# Patient Record
Sex: MALE | Race: BLACK OR AFRICAN AMERICAN | NOT HISPANIC OR LATINO
[De-identification: names, ages, dates, MRNs, and addresses within clinical notes are randomized per-mention and may not be internally consistent; named-entity substitution may affect disease eponyms.]

---

## 2019-12-20 ENCOUNTER — APPOINTMENT (OUTPATIENT)
Dept: FAMILY MEDICINE | Facility: CLINIC | Age: 29
End: 2019-12-20
Payer: OTHER GOVERNMENT

## 2019-12-20 VITALS
SYSTOLIC BLOOD PRESSURE: 133 MMHG | OXYGEN SATURATION: 98 % | DIASTOLIC BLOOD PRESSURE: 95 MMHG | TEMPERATURE: 98 F | WEIGHT: 205 LBS | BODY MASS INDEX: 26.31 KG/M2 | HEART RATE: 98 BPM | HEIGHT: 74 IN

## 2019-12-20 DIAGNOSIS — Z78.9 OTHER SPECIFIED HEALTH STATUS: ICD-10-CM

## 2019-12-20 DIAGNOSIS — Z80.7 FAMILY HISTORY OF OTHER MALIGNANT NEOPLASMS OF LYMPHOID, HEMATOPOIETIC AND RELATED TISSUES: ICD-10-CM

## 2019-12-20 DIAGNOSIS — Z00.00 ENCOUNTER FOR GENERAL ADULT MEDICAL EXAMINATION W/OUT ABNORMAL FINDINGS: ICD-10-CM

## 2019-12-20 PROCEDURE — G0444 DEPRESSION SCREEN ANNUAL: CPT | Mod: 59

## 2019-12-20 PROCEDURE — 99203 OFFICE O/P NEW LOW 30 MIN: CPT | Mod: 25

## 2019-12-20 PROCEDURE — 99385 PREV VISIT NEW AGE 18-39: CPT | Mod: 25

## 2019-12-20 PROCEDURE — 36415 COLL VENOUS BLD VENIPUNCTURE: CPT

## 2019-12-20 PROCEDURE — G0442 ANNUAL ALCOHOL SCREEN 15 MIN: CPT | Mod: 59

## 2019-12-20 NOTE — REVIEW OF SYSTEMS
[Anxiety] : anxiety [Negative] : Heme/Lymph [Suicidal] : not suicidal [Depression] : no depression [Insomnia] : no insomnia

## 2019-12-20 NOTE — PHYSICAL EXAM
[No Acute Distress] : no acute distress [Well Nourished] : well nourished [Well Developed] : well developed [Well-Appearing] : well-appearing [Normal Sclera/Conjunctiva] : normal sclera/conjunctiva [PERRL] : pupils equal round and reactive to light [EOMI] : extraocular movements intact [Normal Outer Ear/Nose] : the outer ears and nose were normal in appearance [Normal Oropharynx] : the oropharynx was normal [No JVD] : no jugular venous distention [Supple] : supple [No Lymphadenopathy] : no lymphadenopathy [Thyroid Normal, No Nodules] : the thyroid was normal and there were no nodules present [No Accessory Muscle Use] : no accessory muscle use [No Respiratory Distress] : no respiratory distress  [Clear to Auscultation] : lungs were clear to auscultation bilaterally [Normal Rate] : normal rate  [Regular Rhythm] : with a regular rhythm [Normal S1, S2] : normal S1 and S2 [No Carotid Bruits] : no carotid bruits [No Murmur] : no murmur heard [No Abdominal Bruit] : a ~M bruit was not heard ~T in the abdomen [No Varicosities] : no varicosities [Pedal Pulses Present] : the pedal pulses are present [No Edema] : there was no peripheral edema [No Palpable Aorta] : no palpable aorta [No Extremity Clubbing/Cyanosis] : no extremity clubbing/cyanosis [Soft] : abdomen soft [Non-distended] : non-distended [Non Tender] : non-tender [Normal Bowel Sounds] : normal bowel sounds [No HSM] : no HSM [No Masses] : no abdominal mass palpated [Normal Posterior Cervical Nodes] : no posterior cervical lymphadenopathy [Normal Anterior Cervical Nodes] : no anterior cervical lymphadenopathy [No Spinal Tenderness] : no spinal tenderness [No CVA Tenderness] : no CVA  tenderness [Grossly Normal Strength/Tone] : grossly normal strength/tone [No Joint Swelling] : no joint swelling [No Rash] : no rash [Coordination Grossly Intact] : coordination grossly intact [Normal Gait] : normal gait [Deep Tendon Reflexes (DTR)] : deep tendon reflexes were 2+ and symmetric [No Focal Deficits] : no focal deficits [Normal Affect] : the affect was normal [Normal Insight/Judgement] : insight and judgment were intact

## 2019-12-20 NOTE — HISTORY OF PRESENT ILLNESS
[de-identified] : 28 yo m presents to establish care.\par Hx of anxiety causing trouble sleeping as of the past year. Father recent diagnosis of cancer x 1 year. Interested in seeking therapy as well.\par Denies fevers, chills, cp, sob.  [FreeTextEntry1] : establish care

## 2019-12-20 NOTE — HEALTH RISK ASSESSMENT
[Good] : ~his/her~ current health as good [Fair] :  ~his/her~ mood as fair [1 or 2 (0 pts)] : 1 or 2 (0 points) [Yes] : Yes [2 - 3 times a week (3 pts)] : 2 - 3  times a week (3 points) [Never (0 pts)] : Never (0 points) [No] : In the past 12 months have you used drugs other than those required for medical reasons? No [No falls in past year] : Patient reported no falls in the past year [0] : 2) Feeling down, depressed, or hopeless: Not at all (0) [HIV test declined] : HIV test declined [Hepatitis C test declined] : Hepatitis C test declined [None] : None [With Significant Other] : lives with significant other [Employed] : employed [Sexually Active] : sexually active [] :  [Feels Safe at Home] : Feels safe at home [Fully functional (bathing, dressing, toileting, transferring, walking, feeding)] : Fully functional (bathing, dressing, toileting, transferring, walking, feeding) [Fully functional (using the telephone, shopping, preparing meals, housekeeping, doing laundry, using] : Fully functional and needs no help or supervision to perform IADLs (using the telephone, shopping, preparing meals, housekeeping, doing laundry, using transportation, managing medications and managing finances) [] : No [Audit-CScore] : 3 [de-identified] : 3x/ week, cardio and weights  [[id number]] : 0 [de-identified] : varied/ balanced  [Change in mental status noted] : No change in mental status noted [High Risk Behavior] : no high risk behavior [Language] : denies difficulty with language [Reports changes in hearing] : Reports no changes in hearing [FreeTextEntry2] : [employer] [Reports changes in vision] : Reports no changes in vision

## 2019-12-23 DIAGNOSIS — R79.89 OTHER SPECIFIED ABNORMAL FINDINGS OF BLOOD CHEMISTRY: ICD-10-CM

## 2019-12-23 LAB
25(OH)D3 SERPL-MCNC: 11.6 NG/ML
ALBUMIN SERPL ELPH-MCNC: 5.1 G/DL
ALP BLD-CCNC: 61 U/L
ALT SERPL-CCNC: 72 U/L
ANION GAP SERPL CALC-SCNC: 14 MMOL/L
AST SERPL-CCNC: 35 U/L
BASOPHILS # BLD AUTO: 0.03 K/UL
BASOPHILS NFR BLD AUTO: 0.7 %
BILIRUB SERPL-MCNC: 0.5 MG/DL
BUN SERPL-MCNC: 6 MG/DL
CALCIUM SERPL-MCNC: 9.7 MG/DL
CHLORIDE SERPL-SCNC: 104 MMOL/L
CHOLEST SERPL-MCNC: 247 MG/DL
CHOLEST/HDLC SERPL: 3 RATIO
CO2 SERPL-SCNC: 24 MMOL/L
CREAT SERPL-MCNC: 0.92 MG/DL
EOSINOPHIL # BLD AUTO: 0.05 K/UL
EOSINOPHIL NFR BLD AUTO: 1.2 %
ESTIMATED AVERAGE GLUCOSE: 111 MG/DL
GLUCOSE SERPL-MCNC: 96 MG/DL
HBA1C MFR BLD HPLC: 5.5 %
HCT VFR BLD CALC: 45.9 %
HDLC SERPL-MCNC: 83 MG/DL
HGB BLD-MCNC: 14.7 G/DL
IMM GRANULOCYTES NFR BLD AUTO: 0.2 %
LDLC SERPL CALC-MCNC: 146 MG/DL
LYMPHOCYTES # BLD AUTO: 1.71 K/UL
LYMPHOCYTES NFR BLD AUTO: 42.4 %
MAN DIFF?: NORMAL
MCHC RBC-ENTMCNC: 30.1 PG
MCHC RBC-ENTMCNC: 32 GM/DL
MCV RBC AUTO: 94.1 FL
MONOCYTES # BLD AUTO: 0.46 K/UL
MONOCYTES NFR BLD AUTO: 11.4 %
NEUTROPHILS # BLD AUTO: 1.77 K/UL
NEUTROPHILS NFR BLD AUTO: 44.1 %
PLATELET # BLD AUTO: 208 K/UL
POTASSIUM SERPL-SCNC: 4.4 MMOL/L
PROT SERPL-MCNC: 7.7 G/DL
RBC # BLD: 4.88 M/UL
RBC # FLD: 14.3 %
SODIUM SERPL-SCNC: 142 MMOL/L
TRIGL SERPL-MCNC: 88 MG/DL
TSH SERPL-ACNC: 1.23 UIU/ML
WBC # FLD AUTO: 4.03 K/UL

## 2020-01-08 ENCOUNTER — APPOINTMENT (OUTPATIENT)
Dept: FAMILY MEDICINE | Facility: CLINIC | Age: 30
End: 2020-01-08
Payer: OTHER GOVERNMENT

## 2020-01-08 VITALS
SYSTOLIC BLOOD PRESSURE: 147 MMHG | WEIGHT: 205 LBS | BODY MASS INDEX: 26.31 KG/M2 | DIASTOLIC BLOOD PRESSURE: 91 MMHG | HEIGHT: 74 IN | OXYGEN SATURATION: 100 % | HEART RATE: 94 BPM | TEMPERATURE: 98.9 F

## 2020-01-08 DIAGNOSIS — F41.9 ANXIETY DISORDER, UNSPECIFIED: ICD-10-CM

## 2020-01-08 DIAGNOSIS — R03.0 ELEVATED BLOOD-PRESSURE READING, W/OUT DIAGNOSIS OF HYPERTENSION: ICD-10-CM

## 2020-01-08 PROCEDURE — 99213 OFFICE O/P EST LOW 20 MIN: CPT | Mod: 25

## 2020-01-08 NOTE — HISTORY OF PRESENT ILLNESS
[FreeTextEntry1] : anxiety med fu\par bp med fu  [de-identified] : 28 yo m presents to discuss anxiety meds. \par Has been taking 10 mg. No side effects, feels well on it. \par Mentions he feels better on it. \par BP elevated, just found out he is having baby. \par Denies fevers, chills, cp, sob.

## 2020-08-14 VITALS
TEMPERATURE: 98 F | OXYGEN SATURATION: 98 % | HEART RATE: 152 BPM | RESPIRATION RATE: 16 BRPM | SYSTOLIC BLOOD PRESSURE: 118 MMHG | DIASTOLIC BLOOD PRESSURE: 85 MMHG

## 2020-08-14 LAB
ALBUMIN SERPL ELPH-MCNC: 3.4 G/DL — SIGNIFICANT CHANGE UP (ref 3.3–5)
ALBUMIN SERPL ELPH-MCNC: 4.4 G/DL — SIGNIFICANT CHANGE UP (ref 3.3–5)
ALP SERPL-CCNC: 61 U/L — SIGNIFICANT CHANGE UP (ref 40–120)
ALP SERPL-CCNC: SIGNIFICANT CHANGE UP (ref 40–120)
ALT FLD-CCNC: 51 U/L — HIGH (ref 10–45)
ALT FLD-CCNC: SIGNIFICANT CHANGE UP (ref 10–45)
ANION GAP SERPL CALC-SCNC: 26 MMOL/L — HIGH (ref 5–17)
ANION GAP SERPL CALC-SCNC: 30 MMOL/L — HIGH (ref 5–17)
ANION GAP SERPL CALC-SCNC: 35 MMOL/L — HIGH (ref 5–17)
ANISOCYTOSIS BLD QL: SLIGHT — SIGNIFICANT CHANGE UP
APPEARANCE UR: CLEAR — SIGNIFICANT CHANGE UP
APTT BLD: 33.9 SEC — SIGNIFICANT CHANGE UP (ref 27.5–35.5)
AST SERPL-CCNC: 76 U/L — HIGH (ref 10–40)
AST SERPL-CCNC: SIGNIFICANT CHANGE UP (ref 10–40)
BASE EXCESS BLDA CALC-SCNC: -10.2 MMOL/L — LOW (ref -2–3)
BASE EXCESS BLDV CALC-SCNC: -9.6 MMOL/L — SIGNIFICANT CHANGE UP
BASOPHILS # BLD AUTO: 0 K/UL — SIGNIFICANT CHANGE UP (ref 0–0.2)
BASOPHILS NFR BLD AUTO: 0 % — SIGNIFICANT CHANGE UP (ref 0–2)
BILIRUB SERPL-MCNC: 0.9 MG/DL — SIGNIFICANT CHANGE UP (ref 0.2–1.2)
BILIRUB SERPL-MCNC: 1.1 MG/DL — SIGNIFICANT CHANGE UP (ref 0.2–1.2)
BILIRUB UR-MCNC: ABNORMAL
BLD GP AB SCN SERPL QL: NEGATIVE — SIGNIFICANT CHANGE UP
BUN SERPL-MCNC: 10 MG/DL — SIGNIFICANT CHANGE UP (ref 7–23)
BUN SERPL-MCNC: 9 MG/DL — SIGNIFICANT CHANGE UP (ref 7–23)
BUN SERPL-MCNC: 9 MG/DL — SIGNIFICANT CHANGE UP (ref 7–23)
CA-I SERPL-SCNC: 0.96 MMOL/L — LOW (ref 1.12–1.3)
CALCIUM SERPL-MCNC: 10.2 MG/DL — SIGNIFICANT CHANGE UP (ref 8.4–10.5)
CALCIUM SERPL-MCNC: 7.6 MG/DL — LOW (ref 8.4–10.5)
CALCIUM SERPL-MCNC: 8.5 MG/DL — SIGNIFICANT CHANGE UP (ref 8.4–10.5)
CHLORIDE SERPL-SCNC: 83 MMOL/L — LOW (ref 96–108)
CHLORIDE SERPL-SCNC: 93 MMOL/L — LOW (ref 96–108)
CHLORIDE SERPL-SCNC: 97 MMOL/L — SIGNIFICANT CHANGE UP (ref 96–108)
CO2 SERPL-SCNC: 12 MMOL/L — LOW (ref 22–31)
CO2 SERPL-SCNC: 15 MMOL/L — LOW (ref 22–31)
CO2 SERPL-SCNC: 15 MMOL/L — LOW (ref 22–31)
COLOR SPEC: YELLOW — SIGNIFICANT CHANGE UP
CREAT SERPL-MCNC: 1.26 MG/DL — SIGNIFICANT CHANGE UP (ref 0.5–1.3)
CREAT SERPL-MCNC: 1.3 MG/DL — SIGNIFICANT CHANGE UP (ref 0.5–1.3)
CREAT SERPL-MCNC: 1.49 MG/DL — HIGH (ref 0.5–1.3)
DACRYOCYTES BLD QL SMEAR: SLIGHT — SIGNIFICANT CHANGE UP
DIFF PNL FLD: ABNORMAL
EOSINOPHIL # BLD AUTO: 0 K/UL — SIGNIFICANT CHANGE UP (ref 0–0.5)
EOSINOPHIL NFR BLD AUTO: 0 % — SIGNIFICANT CHANGE UP (ref 0–6)
GAS PNL BLDV: 136 MMOL/L — LOW (ref 138–146)
GAS PNL BLDV: SIGNIFICANT CHANGE UP
GIANT PLATELETS BLD QL SMEAR: PRESENT — SIGNIFICANT CHANGE UP
GLUCOSE SERPL-MCNC: 207 MG/DL — HIGH (ref 70–99)
GLUCOSE SERPL-MCNC: 248 MG/DL — HIGH (ref 70–99)
GLUCOSE SERPL-MCNC: 304 MG/DL — HIGH (ref 70–99)
GLUCOSE UR QL: 100
HCO3 BLDA-SCNC: 12 MMOL/L — LOW (ref 21–28)
HCO3 BLDV-SCNC: 16 MMOL/L — LOW (ref 20–27)
HCT VFR BLD CALC: 40 % — SIGNIFICANT CHANGE UP (ref 39–50)
HCT VFR BLD CALC: 52.9 % — HIGH (ref 39–50)
HGB BLD-MCNC: 13.8 G/DL — SIGNIFICANT CHANGE UP (ref 13–17)
HGB BLD-MCNC: 18 G/DL — HIGH (ref 13–17)
INR BLD: 1.45 — HIGH (ref 0.88–1.16)
KETONES UR-MCNC: 15 MG/DL
LACTATE SERPL-SCNC: 11 MMOL/L — CRITICAL HIGH (ref 0.5–2)
LACTATE SERPL-SCNC: 13.6 MMOL/L — CRITICAL HIGH (ref 0.5–2)
LACTATE SERPL-SCNC: 16 MMOL/L — CRITICAL HIGH (ref 0.5–2)
LEUKOCYTE ESTERASE UR-ACNC: NEGATIVE — SIGNIFICANT CHANGE UP
LIDOCAIN IGE QN: 649 U/L — HIGH (ref 7–60)
LYMPHOCYTES # BLD AUTO: 0.18 K/UL — LOW (ref 1–3.3)
LYMPHOCYTES # BLD AUTO: 0.9 % — LOW (ref 13–44)
MACROCYTES BLD QL: SIGNIFICANT CHANGE UP
MANUAL SMEAR VERIFICATION: SIGNIFICANT CHANGE UP
MCHC RBC-ENTMCNC: 31.3 PG — SIGNIFICANT CHANGE UP (ref 27–34)
MCHC RBC-ENTMCNC: 31.4 PG — SIGNIFICANT CHANGE UP (ref 27–34)
MCHC RBC-ENTMCNC: 34 GM/DL — SIGNIFICANT CHANGE UP (ref 32–36)
MCHC RBC-ENTMCNC: 34.5 GM/DL — SIGNIFICANT CHANGE UP (ref 32–36)
MCV RBC AUTO: 91.1 FL — SIGNIFICANT CHANGE UP (ref 80–100)
MCV RBC AUTO: 91.8 FL — SIGNIFICANT CHANGE UP (ref 80–100)
MONOCYTES # BLD AUTO: 0.52 K/UL — SIGNIFICANT CHANGE UP (ref 0–0.9)
MONOCYTES NFR BLD AUTO: 2.6 % — SIGNIFICANT CHANGE UP (ref 2–14)
NEUTROPHILS # BLD AUTO: 19.16 K/UL — HIGH (ref 1.8–7.4)
NEUTROPHILS NFR BLD AUTO: 95.6 % — HIGH (ref 43–77)
NEUTS BAND # BLD: 0.9 % — SIGNIFICANT CHANGE UP (ref 0–8)
NITRITE UR-MCNC: NEGATIVE — SIGNIFICANT CHANGE UP
PCO2 BLDA: 21 MMHG — LOW (ref 35–48)
PCO2 BLDV: 32 MMHG — LOW (ref 41–51)
PH BLDA: 7.39 — SIGNIFICANT CHANGE UP (ref 7.35–7.45)
PH BLDV: 7.3 — LOW (ref 7.32–7.43)
PH UR: 6 — SIGNIFICANT CHANGE UP (ref 5–8)
PLAT MORPH BLD: NORMAL — SIGNIFICANT CHANGE UP
PLATELET # BLD AUTO: 192 K/UL — SIGNIFICANT CHANGE UP (ref 150–400)
PLATELET # BLD AUTO: 287 K/UL — SIGNIFICANT CHANGE UP (ref 150–400)
PO2 BLDA: 210 MMHG — HIGH (ref 83–108)
PO2 BLDV: 47 MMHG — SIGNIFICANT CHANGE UP
POLYCHROMASIA BLD QL SMEAR: SLIGHT — SIGNIFICANT CHANGE UP
POTASSIUM BLDV-SCNC: 3.9 MMOL/L — SIGNIFICANT CHANGE UP (ref 3.5–4.9)
POTASSIUM SERPL-MCNC: 3.6 MMOL/L — SIGNIFICANT CHANGE UP (ref 3.5–5.3)
POTASSIUM SERPL-MCNC: 3.9 MMOL/L — SIGNIFICANT CHANGE UP (ref 3.5–5.3)
POTASSIUM SERPL-MCNC: SIGNIFICANT CHANGE UP (ref 3.5–5.3)
POTASSIUM SERPL-SCNC: 3.6 MMOL/L — SIGNIFICANT CHANGE UP (ref 3.5–5.3)
POTASSIUM SERPL-SCNC: 3.9 MMOL/L — SIGNIFICANT CHANGE UP (ref 3.5–5.3)
POTASSIUM SERPL-SCNC: SIGNIFICANT CHANGE UP (ref 3.5–5.3)
PROT SERPL-MCNC: 6.1 G/DL — SIGNIFICANT CHANGE UP (ref 6–8.3)
PROT SERPL-MCNC: 8.2 G/DL — SIGNIFICANT CHANGE UP (ref 6–8.3)
PROT UR-MCNC: 100 MG/DL
PROTHROM AB SERPL-ACNC: 17.1 SEC — HIGH (ref 10.6–13.6)
RBC # BLD: 4.39 M/UL — SIGNIFICANT CHANGE UP (ref 4.2–5.8)
RBC # BLD: 5.76 M/UL — SIGNIFICANT CHANGE UP (ref 4.2–5.8)
RBC # FLD: 14.4 % — SIGNIFICANT CHANGE UP (ref 10.3–14.5)
RBC # FLD: 14.7 % — HIGH (ref 10.3–14.5)
RBC BLD AUTO: ABNORMAL
RH IG SCN BLD-IMP: POSITIVE — SIGNIFICANT CHANGE UP
RH IG SCN BLD-IMP: POSITIVE — SIGNIFICANT CHANGE UP
SAO2 % BLDA: 100 % — SIGNIFICANT CHANGE UP (ref 95–100)
SAO2 % BLDV: 73 % — SIGNIFICANT CHANGE UP
SARS-COV-2 RNA SPEC QL NAA+PROBE: SIGNIFICANT CHANGE UP
SMUDGE CELLS # BLD: PRESENT — SIGNIFICANT CHANGE UP
SODIUM SERPL-SCNC: 133 MMOL/L — LOW (ref 135–145)
SODIUM SERPL-SCNC: 135 MMOL/L — SIGNIFICANT CHANGE UP (ref 135–145)
SODIUM SERPL-SCNC: 138 MMOL/L — SIGNIFICANT CHANGE UP (ref 135–145)
SP GR SPEC: >=1.03 — SIGNIFICANT CHANGE UP (ref 1–1.03)
SPHEROCYTES BLD QL SMEAR: SIGNIFICANT CHANGE UP
UROBILINOGEN FLD QL: 0.2 E.U./DL — SIGNIFICANT CHANGE UP
WBC # BLD: 14.67 K/UL — HIGH (ref 3.8–10.5)
WBC # BLD: 19.86 K/UL — HIGH (ref 3.8–10.5)
WBC # FLD AUTO: 14.67 K/UL — HIGH (ref 3.8–10.5)
WBC # FLD AUTO: 19.86 K/UL — HIGH (ref 3.8–10.5)

## 2020-08-14 PROCEDURE — 74019 RADEX ABDOMEN 2 VIEWS: CPT | Mod: 26

## 2020-08-14 PROCEDURE — 74176 CT ABD & PELVIS W/O CONTRAST: CPT | Mod: 26

## 2020-08-14 PROCEDURE — 71046 X-RAY EXAM CHEST 2 VIEWS: CPT | Mod: 26

## 2020-08-14 PROCEDURE — 71045 X-RAY EXAM CHEST 1 VIEW: CPT | Mod: 26,59

## 2020-08-14 RX ORDER — MORPHINE SULFATE 50 MG/1
4 CAPSULE, EXTENDED RELEASE ORAL ONCE
Refills: 0 | Status: DISCONTINUED | OUTPATIENT
Start: 2020-08-14 | End: 2020-08-14

## 2020-08-14 RX ORDER — SODIUM CHLORIDE 9 MG/ML
1000 INJECTION INTRAMUSCULAR; INTRAVENOUS; SUBCUTANEOUS
Refills: 0 | Status: DISCONTINUED | OUTPATIENT
Start: 2020-08-14 | End: 2020-08-15

## 2020-08-14 RX ORDER — SODIUM CHLORIDE 9 MG/ML
1000 INJECTION INTRAMUSCULAR; INTRAVENOUS; SUBCUTANEOUS ONCE
Refills: 0 | Status: COMPLETED | OUTPATIENT
Start: 2020-08-14 | End: 2020-08-14

## 2020-08-14 RX ORDER — PANTOPRAZOLE SODIUM 20 MG/1
40 TABLET, DELAYED RELEASE ORAL ONCE
Refills: 0 | Status: COMPLETED | OUTPATIENT
Start: 2020-08-14 | End: 2020-08-14

## 2020-08-14 RX ORDER — PIPERACILLIN AND TAZOBACTAM 4; .5 G/20ML; G/20ML
3.38 INJECTION, POWDER, LYOPHILIZED, FOR SOLUTION INTRAVENOUS ONCE
Refills: 0 | Status: COMPLETED | OUTPATIENT
Start: 2020-08-14 | End: 2020-08-14

## 2020-08-14 RX ORDER — FAMOTIDINE 10 MG/ML
20 INJECTION INTRAVENOUS ONCE
Refills: 0 | Status: DISCONTINUED | OUTPATIENT
Start: 2020-08-14 | End: 2020-08-14

## 2020-08-14 RX ORDER — CHLORPROMAZINE HCL 10 MG
25 TABLET ORAL ONCE
Refills: 0 | Status: COMPLETED | OUTPATIENT
Start: 2020-08-14 | End: 2020-08-14

## 2020-08-14 RX ORDER — ONDANSETRON 8 MG/1
4 TABLET, FILM COATED ORAL ONCE
Refills: 0 | Status: COMPLETED | OUTPATIENT
Start: 2020-08-14 | End: 2020-08-14

## 2020-08-14 RX ADMIN — SODIUM CHLORIDE 1000 MILLILITER(S): 9 INJECTION INTRAMUSCULAR; INTRAVENOUS; SUBCUTANEOUS at 20:44

## 2020-08-14 RX ADMIN — PANTOPRAZOLE SODIUM 40 MILLIGRAM(S): 20 TABLET, DELAYED RELEASE ORAL at 20:34

## 2020-08-14 RX ADMIN — MORPHINE SULFATE 4 MILLIGRAM(S): 50 CAPSULE, EXTENDED RELEASE ORAL at 21:48

## 2020-08-14 RX ADMIN — SODIUM CHLORIDE 1000 MILLILITER(S): 9 INJECTION INTRAMUSCULAR; INTRAVENOUS; SUBCUTANEOUS at 20:18

## 2020-08-14 RX ADMIN — Medication 1 MILLIGRAM(S): at 21:24

## 2020-08-14 RX ADMIN — PIPERACILLIN AND TAZOBACTAM 200 GRAM(S): 4; .5 INJECTION, POWDER, LYOPHILIZED, FOR SOLUTION INTRAVENOUS at 21:24

## 2020-08-14 RX ADMIN — ONDANSETRON 4 MILLIGRAM(S): 8 TABLET, FILM COATED ORAL at 20:34

## 2020-08-14 RX ADMIN — SODIUM CHLORIDE 1000 MILLILITER(S): 9 INJECTION INTRAMUSCULAR; INTRAVENOUS; SUBCUTANEOUS at 21:53

## 2020-08-14 RX ADMIN — Medication 102 MILLIGRAM(S): at 20:35

## 2020-08-14 RX ADMIN — SODIUM CHLORIDE 1000 MILLILITER(S): 9 INJECTION INTRAMUSCULAR; INTRAVENOUS; SUBCUTANEOUS at 21:30

## 2020-08-14 RX ADMIN — MORPHINE SULFATE 4 MILLIGRAM(S): 50 CAPSULE, EXTENDED RELEASE ORAL at 21:24

## 2020-08-14 NOTE — ED ADULT TRIAGE NOTE - OTHER COMPLAINTS
pt c.o abd pain with n/v since yesterday. denies diarrhea. pt presents to ed with elevated HR, ekg in progress.

## 2020-08-14 NOTE — ED PROVIDER NOTE - DIAGNOSTIC INTERPRETATION
ER Physician:  Annabelle Director  CHEST XRAY INTERPRETATION: lungs clear, heart shadow normal, bony structures intact   ER Physician: Annabelle Naqvi  ABDOMINAL XRAY INTERPRETATION:  sbo, ? volvulus, no free air noted, no apparent hepatomegaly

## 2020-08-14 NOTE — ED ADULT NURSE REASSESSMENT NOTE - NS ED NURSE REASSESS COMMENT FT1
Clear yellow urine sample collected, sent to lab, waiting results. Continuous monitoring of pt maintained, pt waiting admission bed assignment

## 2020-08-14 NOTE — ED PROVIDER NOTE - OBJECTIVE STATEMENT
31 yo male h/o anxiety (noncompliant w lexapro) c/o 3 abd bloating/pain, distension, obstipation w/o bm x 4 d, no flatus, intractable hiccups and n/v with any po intake attempts.  No prior abd surgery or sx.  No sick contacts, travel.  No fever.  No cp, sob.  Pt states he drinks 2-3 alchoholic tea drinks a day but denies h/o w/d sx if he doesn't drink.  Pt states he tried drinking shots today to stop the hiccuping as this has helped in the past but did not help this time.  Pt denies h/o etoh abuse.  Pt reports no po's x 3 d 2/2 sx.  No uri sx, cough, dysuria.  No drugs.

## 2020-08-14 NOTE — CONSULT NOTE ADULT - ASSESSMENT
This is a 31 y/o M, who presented with severe abdominal pain and vomiting, ED consulted us because of suspicion of sigmoid volvulus.    The patient was tachycardic on presentation, resuscitated with IV fluids NGT was inserted    Lactate was 16 ---->13--->11, WBC 19.8 1 dose of Zosyn was given by ED  Lipase 645    The patient was found to have severe pancreatitis, no evidence of volvulus on the CT scan.      Plan:  - No surgical management is warranted  - Continue resuscitation  - Bowel rest and maintain NGT to low intermittent suction  - Surgery will continue to follow

## 2020-08-14 NOTE — ED ADULT NURSE REASSESSMENT NOTE - NS ED NURSE REASSESS COMMENT FT1
NG tube placed by SUrgery resident, drain dark brown output. Xray to bedside for placement confirmation.

## 2020-08-14 NOTE — CONSULT NOTE ADULT - SUBJECTIVE AND OBJECTIVE BOX
This is a 31 y/o m who presented to ED with severe abdominal pian and distension that started 3 days ago, and was gradually increasing in intensity and was allover his abdomen associated with vomiting multiple times and obstipation and severe bloating.  The patient said that he had this pain before with bloating but it always resolved on its own within 2 days but this time increased especially after he had 5 shots of alcohol. The patient denies, fever or chills.  The patient admit to daily drinking, amount was non specified.   The ED consulted us after obtaining of XR that was showing suspicion of sigmoid volvulus  On arrival to ED the patient was severely tachycardic 154 Sinus Rhythm BP was WNL, lactate was 16 and WBC 19, lipase 645., Glucose 304  The patient was properly resuscitated with 3 L of NS, NGT was inserted only 150 dark fluid came out      PMH: ADD on Lexapro (non compliant)   PSH: surgery on left knee   FMH; father had Lymphoma  SH: daily etoh, occasional cigar, denies illicit drugs  Allergies: NKDA    Vital Signs Last 24 Hrs  T(C): 36.9 (14 Aug 2020 19:44), Max: 36.9 (14 Aug 2020 19:44)  T(F): 98.4 (14 Aug 2020 19:44), Max: 98.4 (14 Aug 2020 19:44)  HR: 156 (14 Aug 2020 23:00) (144 - 156)  BP: 104/53 (14 Aug 2020 23:00) (104/53 - 134/60)  BP(mean): --  RR: 18 (14 Aug 2020 23:00) (16 - 20)  SpO2: 99% (14 Aug 2020 23:00) (98% - 99%)    Gen: in distress  Pulm: Good inspiratory effort, nonlabored breathing  C/V: tachycardic   Abd: moderately distended diffuse tenderness, nondistended. No rebound, no guarding.   Extrem: WWP, no edema                          13.8   14.67 )-----------( 192      ( 14 Aug 2020 22:49 )             40.0   08-14    138  |  97  |  9   ----------------------------<  207<H>  3.9   |  15<L>  |  1.26    Ca    7.6<L>      14 Aug 2020 23:03    TPro  6.1  /  Alb  3.4  /  TBili  0.9  /  DBili  x   /  AST  76<H>  /  ALT  51<H>  /  AlkPhos  61  08-14    Lipase, Serum: 649 U/L (08.14.20 @ 20:23)    Triglycerides, Serum: 133 mg/dL (08.14.20 @ 21:20)    Lactate, Blood: 11.0: TYPE:(C=Critical, N=Notification, A=Abnormal) C    < from: CT Abdomen and Pelvis No Cont (08.14.20 @ 22:35) >  indings:    Evaluation of the abdominopelvic viscera is limited due to noncontrast technique and streak artifact from overlying arms.    Lower chest: No consolidation or significant pleural effusion.    Liver:  Diffuse hepatic steatosis. Mild hepatomegaly. No definite focal lesion.    Gallbladder: No calcified gallstones. Gallbladder sludge. No biliary dilatation.    Spleen:  Normal.    Pancreas:  Pancreas is diffusely enlarged with significant amount of peripancreatic fat strandingand fluid tracking down both lateral aspects of the anterior pararenal space. No drainable/organized collection on this noncontrast examination. No pancreatic duct dilatation.    Adrenal glands:  Normal.    Kidneys: Normal.    Adenopathy:  No lymphadenopathy in abdomen or pelvis.    Ascites: Mild ascites along the right paracolic gutter and within the rectovesical pouch.    Gastrointestinal tract: Evaluation of the GI tract without the use of oral contrast shows an enteric tube with tip in the gastric antrum. No bowel obstruction. Gas-filled ascending and transverse colon. Wall thickening of the descending colon is likely reactive due to adjacent inflammatory changes from the acute pancreatitis. Possible mild wall thickening of the second andthird duodenal segments could represent reactive duodenitis. Appendix is normal.    Vessels: Normal.    Pelvic organs: Distended bladder. Normal-sized prostate.    Soft tissues: Normal.    Bones: Normal.    Impression:  Interstitial edematous pancreatitis. No drainable/organized collection on this noncontrast examination. Additional findings as above.      < end of copied text >

## 2020-08-14 NOTE — ED ADULT NURSE REASSESSMENT NOTE - NS ED NURSE REASSESS COMMENT FT1
Pt transported to CT and back to ER room 20, on monitor with RN and tech, waiting results. Repeat labs drawn and sent, waiting results.

## 2020-08-14 NOTE — ED ADULT NURSE REASSESSMENT NOTE - NS ED NURSE REASSESS COMMENT FT1
Report received from DUSTY Bender, will assume care of pt at this time. Pt in ER room 20, on cardiac monitor, NIBP and SpO2, NS up infusing to 18g RAC, 16f NGT, left nare, to intermittent, in-wall suction. Assessment as noted, pt waiting transportation to CT

## 2020-08-14 NOTE — ED ADULT NURSE NOTE - NSIMPLEMENTINTERV_GEN_ALL_ED
Implemented All Universal Safety Interventions:  [place] to call system. Call bell, personal items and telephone within reach. Instruct patient to call for assistance. Room bathroom lighting operational. Non-slip footwear when patient is off stretcher. Physically safe environment: no spills, clutter or unnecessary equipment. Stretcher in lowest position, wheels locked, appropriate side rails in place.

## 2020-08-14 NOTE — ED ADULT NURSE NOTE - OBJECTIVE STATEMENT
30y M, A&ox3, presents to ed for abd pain with nausea since yesterday with hiccups. No cp, no sob, no cough, no fevers nor chills. Pt noted tachy in 140s-150s, upgraded to . Director. Noted distended abdomen, with decreased flatulence, last bm on tuesday. No urinary s/s. IV placed, lab drawn. Pt brought to xray.

## 2020-08-14 NOTE — ED ADULT NURSE REASSESSMENT NOTE - NS ED NURSE REASSESS COMMENT FT1
NG tube placement confirmed via xray. some relief in s/s. PT continues to be tachy 140s, on cardiac monitor. PT pending ct, ct made aware. Pt endorsed to DUSTY LEES

## 2020-08-14 NOTE — ED PROVIDER NOTE - CLINICAL SUMMARY MEDICAL DECISION MAKING FREE TEXT BOX
Pt c/o abd pain, distension, hiccuping, obstipation; exam and hpi concerning for sbo, less concern for perf.  Pt reports etoh use but no h/o w/d, ? if w/d contributing to abnl vs or pt just w severe dehydration and pain.  ? pancreatitis, pud.  Plan labs, ivf, pain/n meds, axr, cxr, ct abd (will assess po contrast after axr);reassess. Pt c/o abd pain, distension, hiccuping, obstipation; exam and hpi concerning for sbo, less concern for perf.  Pt w tachycardia, appears dehydrated on exam. Pt reports etoh use but no h/o w/d, ? if etoh depredence and w/d contributing to abnl vs or pt just w severe dehydration and pain.  ? pancreatitis, pud.  Plan labs, ivf, pain/n meds, axr, cxr, ct abd (will assess po contrast after axr), consider benzos, ;reassess.

## 2020-08-14 NOTE — ED PROVIDER NOTE - CONSTITUTIONAL, MLM
normal... ill appearing, awake, alert, oriented to person, place, time/situation, intractable hiccuping

## 2020-08-14 NOTE — ED PROVIDER NOTE - PROGRESS NOTE DETAILS
Surg urgently consulted ~ 9 p 2/2 lactate 16, xray w sbo pattern and ? volvulus.  Pt w elevated wbc, h/h - suspect h/h elevated 2/2 hemoconcentration.  Lipase 649 - ativan added in case etoh w/d affecting pt's condition and vs.  CT abd and repeat labs as well as abg ordered.  Surg at bedside and planning NG, kallie, agree w current mgmt. Glu elevated, + ag but pH normal, glu improving w ivf - unclear if pt w dka vs ag 2/2 lactic acidosis.  ? if pt in new dka 2/2 intra-abd process.  Beta hydroxybutyrate added to labs.  Pt denies h/o dm. Glu elevated, + ag but pH normal, glu improving w ivf - unclear if pt w dka vs ag 2/2 lactic acidosis.  ? if pt in new dka 2/2 intra-abd process. ? pancreatitis 2/2 high tg.   Beta hydroxybutyrate, tg added to labs.  Pt denies h/o dm.  Repeat bmp, vbg, lactate, cbc ordered after 4th liter. Glu elevated, + ag but pH normal, glu improving w ivf - unclear if pt w dka vs ag 2/2 lactic acidosis and aka.  ? if pt in new dka 2/2 intra-abd process. ? pancreatitis 2/2 high tg or etoh abuse.   Beta hydroxybutyrate, tg added to labs.  Pt denies h/o dm.  Repeat bmp, vbg, lactate, cbc ordered after 4th liter. Beta hydroxybutyrate and tg nl.  CT w/ pancreatitis but no sbo, volvulus. Surg signed off.  GI consulted and will eval in am.  ICU consulted and will admit to step down.

## 2020-08-14 NOTE — ED PROVIDER NOTE - ENMT, MLM
Airway patent, Nasal mucosa clear. Mouth with v dry mucosa. Throat has no vesicles, no oropharyngeal exudates and uvula is midline.

## 2020-08-15 ENCOUNTER — INPATIENT (INPATIENT)
Facility: HOSPITAL | Age: 30
LOS: 2 days | Discharge: HOME | DRG: 439 | End: 2020-08-18
Attending: HOSPITALIST | Admitting: HOSPITALIST
Payer: OTHER GOVERNMENT

## 2020-08-15 DIAGNOSIS — K70.10 ALCOHOLIC HEPATITIS WITHOUT ASCITES: ICD-10-CM

## 2020-08-15 DIAGNOSIS — D72.829 ELEVATED WHITE BLOOD CELL COUNT, UNSPECIFIED: ICD-10-CM

## 2020-08-15 DIAGNOSIS — R73.9 HYPERGLYCEMIA, UNSPECIFIED: ICD-10-CM

## 2020-08-15 DIAGNOSIS — F10.10 ALCOHOL ABUSE, UNCOMPLICATED: ICD-10-CM

## 2020-08-15 DIAGNOSIS — R63.8 OTHER SYMPTOMS AND SIGNS CONCERNING FOOD AND FLUID INTAKE: ICD-10-CM

## 2020-08-15 DIAGNOSIS — Z98.890 OTHER SPECIFIED POSTPROCEDURAL STATES: Chronic | ICD-10-CM

## 2020-08-15 DIAGNOSIS — E87.2 ACIDOSIS: ICD-10-CM

## 2020-08-15 DIAGNOSIS — R00.0 TACHYCARDIA, UNSPECIFIED: ICD-10-CM

## 2020-08-15 DIAGNOSIS — K85.90 ACUTE PANCREATITIS WITHOUT NECROSIS OR INFECTION, UNSPECIFIED: ICD-10-CM

## 2020-08-15 LAB
A1C WITH ESTIMATED AVERAGE GLUCOSE RESULT: 5.6 % — SIGNIFICANT CHANGE UP (ref 4–5.6)
ALBUMIN SERPL ELPH-MCNC: 3.5 G/DL — SIGNIFICANT CHANGE UP (ref 3.3–5)
ALP SERPL-CCNC: 59 U/L — SIGNIFICANT CHANGE UP (ref 40–120)
ALT FLD-CCNC: 49 U/L — HIGH (ref 10–45)
ANION GAP SERPL CALC-SCNC: 18 MMOL/L — HIGH (ref 5–17)
ANION GAP SERPL CALC-SCNC: 18 MMOL/L — HIGH (ref 5–17)
APTT BLD: 33.7 SEC — SIGNIFICANT CHANGE UP (ref 27.5–35.5)
AST SERPL-CCNC: 88 U/L — HIGH (ref 10–40)
BACTERIA # UR AUTO: PRESENT /HPF
BASOPHILS # BLD AUTO: 0 K/UL — SIGNIFICANT CHANGE UP (ref 0–0.2)
BASOPHILS NFR BLD AUTO: 0 % — SIGNIFICANT CHANGE UP (ref 0–2)
BILIRUB SERPL-MCNC: 1.5 MG/DL — HIGH (ref 0.2–1.2)
BUN SERPL-MCNC: 10 MG/DL — SIGNIFICANT CHANGE UP (ref 7–23)
BUN SERPL-MCNC: 11 MG/DL — SIGNIFICANT CHANGE UP (ref 7–23)
CALCIUM SERPL-MCNC: 8.5 MG/DL — SIGNIFICANT CHANGE UP (ref 8.4–10.5)
CALCIUM SERPL-MCNC: 8.6 MG/DL — SIGNIFICANT CHANGE UP (ref 8.4–10.5)
CHLORIDE SERPL-SCNC: 96 MMOL/L — SIGNIFICANT CHANGE UP (ref 96–108)
CHLORIDE SERPL-SCNC: 98 MMOL/L — SIGNIFICANT CHANGE UP (ref 96–108)
CK SERPL-CCNC: 1131 U/L — HIGH (ref 30–200)
CO2 SERPL-SCNC: 22 MMOL/L — SIGNIFICANT CHANGE UP (ref 22–31)
CO2 SERPL-SCNC: 22 MMOL/L — SIGNIFICANT CHANGE UP (ref 22–31)
CREAT SERPL-MCNC: 0.91 MG/DL — SIGNIFICANT CHANGE UP (ref 0.5–1.3)
CREAT SERPL-MCNC: 1.01 MG/DL — SIGNIFICANT CHANGE UP (ref 0.5–1.3)
EOSINOPHIL # BLD AUTO: 0 K/UL — SIGNIFICANT CHANGE UP (ref 0–0.5)
EOSINOPHIL NFR BLD AUTO: 0 % — SIGNIFICANT CHANGE UP (ref 0–6)
EPI CELLS # UR: SIGNIFICANT CHANGE UP /HPF (ref 0–5)
ESTIMATED AVERAGE GLUCOSE: 114 MG/DL — SIGNIFICANT CHANGE UP (ref 68–114)
GLUCOSE BLDC GLUCOMTR-MCNC: 136 MG/DL — HIGH (ref 70–99)
GLUCOSE BLDC GLUCOMTR-MCNC: 145 MG/DL — HIGH (ref 70–99)
GLUCOSE BLDC GLUCOMTR-MCNC: 178 MG/DL — HIGH (ref 70–99)
GLUCOSE BLDC GLUCOMTR-MCNC: 224 MG/DL — HIGH (ref 70–99)
GLUCOSE SERPL-MCNC: 198 MG/DL — HIGH (ref 70–99)
GLUCOSE SERPL-MCNC: 233 MG/DL — HIGH (ref 70–99)
HAV IGM SER-ACNC: SIGNIFICANT CHANGE UP
HBV CORE IGM SER-ACNC: SIGNIFICANT CHANGE UP
HBV SURFACE AG SER-ACNC: SIGNIFICANT CHANGE UP
HCT VFR BLD CALC: 41.1 % — SIGNIFICANT CHANGE UP (ref 39–50)
HCV AB S/CO SERPL IA: 0.09 S/CO — SIGNIFICANT CHANGE UP
HCV AB SERPL-IMP: SIGNIFICANT CHANGE UP
HGB BLD-MCNC: 13.8 G/DL — SIGNIFICANT CHANGE UP (ref 13–17)
HIV 1+2 AB+HIV1 P24 AG SERPL QL IA: SIGNIFICANT CHANGE UP
HYALINE CASTS # UR AUTO: ABNORMAL /LPF (ref 0–2)
INR BLD: 1.28 — HIGH (ref 0.88–1.16)
LACTATE SERPL-SCNC: 4.1 MMOL/L — CRITICAL HIGH (ref 0.5–2)
LACTATE SERPL-SCNC: 7.6 MMOL/L — CRITICAL HIGH (ref 0.5–2)
LACTATE SERPL-SCNC: 8.7 MMOL/L — CRITICAL HIGH (ref 0.5–2)
LYMPHOCYTES # BLD AUTO: 0.28 K/UL — LOW (ref 1–3.3)
LYMPHOCYTES # BLD AUTO: 1.8 % — LOW (ref 13–44)
MAGNESIUM SERPL-MCNC: 1.2 MG/DL — LOW (ref 1.6–2.6)
MAGNESIUM SERPL-MCNC: 1.9 MG/DL — SIGNIFICANT CHANGE UP (ref 1.6–2.6)
MAGNESIUM SERPL-MCNC: 2.1 MG/DL — SIGNIFICANT CHANGE UP (ref 1.6–2.6)
MCHC RBC-ENTMCNC: 30.9 PG — SIGNIFICANT CHANGE UP (ref 27–34)
MCHC RBC-ENTMCNC: 33.6 GM/DL — SIGNIFICANT CHANGE UP (ref 32–36)
MCV RBC AUTO: 92.2 FL — SIGNIFICANT CHANGE UP (ref 80–100)
MONOCYTES # BLD AUTO: 0.69 K/UL — SIGNIFICANT CHANGE UP (ref 0–0.9)
MONOCYTES NFR BLD AUTO: 4.4 % — SIGNIFICANT CHANGE UP (ref 2–14)
NEUTROPHILS # BLD AUTO: 14.78 K/UL — HIGH (ref 1.8–7.4)
NEUTROPHILS NFR BLD AUTO: 90.3 % — HIGH (ref 43–77)
NRBC # BLD: 0 /100 WBCS — SIGNIFICANT CHANGE UP (ref 0–0)
NT-PROBNP SERPL-SCNC: 62 PG/ML — SIGNIFICANT CHANGE UP (ref 0–300)
PHOSPHATE SERPL-MCNC: 3.2 MG/DL — SIGNIFICANT CHANGE UP (ref 2.5–4.5)
PHOSPHATE SERPL-MCNC: 3.2 MG/DL — SIGNIFICANT CHANGE UP (ref 2.5–4.5)
PHOSPHATE SERPL-MCNC: 3.8 MG/DL — SIGNIFICANT CHANGE UP (ref 2.5–4.5)
PLATELET # BLD AUTO: 196 K/UL — SIGNIFICANT CHANGE UP (ref 150–400)
POTASSIUM SERPL-MCNC: 4.7 MMOL/L — SIGNIFICANT CHANGE UP (ref 3.5–5.3)
POTASSIUM SERPL-MCNC: 5.7 MMOL/L — HIGH (ref 3.5–5.3)
POTASSIUM SERPL-SCNC: 4.7 MMOL/L — SIGNIFICANT CHANGE UP (ref 3.5–5.3)
POTASSIUM SERPL-SCNC: 5.7 MMOL/L — HIGH (ref 3.5–5.3)
PROT SERPL-MCNC: 6 G/DL — SIGNIFICANT CHANGE UP (ref 6–8.3)
PROTHROM AB SERPL-ACNC: 15.2 SEC — HIGH (ref 10.6–13.6)
RBC # BLD: 4.46 M/UL — SIGNIFICANT CHANGE UP (ref 4.2–5.8)
RBC # FLD: 14.8 % — HIGH (ref 10.3–14.5)
RBC CASTS # UR COMP ASSIST: < 5 /HPF — SIGNIFICANT CHANGE UP
SODIUM SERPL-SCNC: 136 MMOL/L — SIGNIFICANT CHANGE UP (ref 135–145)
SODIUM SERPL-SCNC: 138 MMOL/L — SIGNIFICANT CHANGE UP (ref 135–145)
TRIGL SERPL-MCNC: 152 MG/DL — HIGH (ref 10–149)
TSH SERPL-MCNC: 1.16 UIU/ML — SIGNIFICANT CHANGE UP (ref 0.35–4.94)
WBC # BLD: 15.76 K/UL — HIGH (ref 3.8–10.5)
WBC # FLD AUTO: 15.76 K/UL — HIGH (ref 3.8–10.5)
WBC UR QL: < 5 /HPF — SIGNIFICANT CHANGE UP

## 2020-08-15 PROCEDURE — 99291 CRITICAL CARE FIRST HOUR: CPT | Mod: 25

## 2020-08-15 PROCEDURE — 99223 1ST HOSP IP/OBS HIGH 75: CPT | Mod: GC

## 2020-08-15 PROCEDURE — 76705 ECHO EXAM OF ABDOMEN: CPT | Mod: 26

## 2020-08-15 PROCEDURE — 74019 RADEX ABDOMEN 2 VIEWS: CPT | Mod: 26

## 2020-08-15 PROCEDURE — 99223 1ST HOSP IP/OBS HIGH 75: CPT

## 2020-08-15 PROCEDURE — 93010 ELECTROCARDIOGRAM REPORT: CPT | Mod: 76

## 2020-08-15 RX ORDER — HYDROMORPHONE HYDROCHLORIDE 2 MG/ML
1 INJECTION INTRAMUSCULAR; INTRAVENOUS; SUBCUTANEOUS EVERY 4 HOURS
Refills: 0 | Status: DISCONTINUED | OUTPATIENT
Start: 2020-08-15 | End: 2020-08-16

## 2020-08-15 RX ORDER — SODIUM CHLORIDE 9 MG/ML
1000 INJECTION INTRAMUSCULAR; INTRAVENOUS; SUBCUTANEOUS
Refills: 0 | Status: DISCONTINUED | OUTPATIENT
Start: 2020-08-15 | End: 2020-08-15

## 2020-08-15 RX ORDER — DEXTROSE 50 % IN WATER 50 %
12.5 SYRINGE (ML) INTRAVENOUS ONCE
Refills: 0 | Status: DISCONTINUED | OUTPATIENT
Start: 2020-08-15 | End: 2020-08-18

## 2020-08-15 RX ORDER — INSULIN LISPRO 100/ML
VIAL (ML) SUBCUTANEOUS
Refills: 0 | Status: DISCONTINUED | OUTPATIENT
Start: 2020-08-15 | End: 2020-08-18

## 2020-08-15 RX ORDER — SODIUM CHLORIDE 9 MG/ML
1000 INJECTION INTRAMUSCULAR; INTRAVENOUS; SUBCUTANEOUS ONCE
Refills: 0 | Status: COMPLETED | OUTPATIENT
Start: 2020-08-15 | End: 2020-08-15

## 2020-08-15 RX ORDER — ENOXAPARIN SODIUM 100 MG/ML
40 INJECTION SUBCUTANEOUS EVERY 24 HOURS
Refills: 0 | Status: DISCONTINUED | OUTPATIENT
Start: 2020-08-15 | End: 2020-08-18

## 2020-08-15 RX ORDER — DEXTROSE 50 % IN WATER 50 %
25 SYRINGE (ML) INTRAVENOUS ONCE
Refills: 0 | Status: DISCONTINUED | OUTPATIENT
Start: 2020-08-15 | End: 2020-08-18

## 2020-08-15 RX ORDER — MAGNESIUM SULFATE 500 MG/ML
2 VIAL (ML) INJECTION ONCE
Refills: 0 | Status: COMPLETED | OUTPATIENT
Start: 2020-08-15 | End: 2020-08-15

## 2020-08-15 RX ORDER — SODIUM CHLORIDE 9 MG/ML
1000 INJECTION, SOLUTION INTRAVENOUS
Refills: 0 | Status: DISCONTINUED | OUTPATIENT
Start: 2020-08-15 | End: 2020-08-18

## 2020-08-15 RX ORDER — DEXTROSE 50 % IN WATER 50 %
15 SYRINGE (ML) INTRAVENOUS ONCE
Refills: 0 | Status: DISCONTINUED | OUTPATIENT
Start: 2020-08-15 | End: 2020-08-18

## 2020-08-15 RX ORDER — THIAMINE MONONITRATE (VIT B1) 100 MG
500 TABLET ORAL EVERY 24 HOURS
Refills: 0 | Status: COMPLETED | OUTPATIENT
Start: 2020-08-15 | End: 2020-08-17

## 2020-08-15 RX ORDER — SODIUM CHLORIDE 9 MG/ML
1000 INJECTION, SOLUTION INTRAVENOUS
Refills: 0 | Status: DISCONTINUED | OUTPATIENT
Start: 2020-08-15 | End: 2020-08-15

## 2020-08-15 RX ORDER — FOLIC ACID 0.8 MG
1 TABLET ORAL EVERY 24 HOURS
Refills: 0 | Status: DISCONTINUED | OUTPATIENT
Start: 2020-08-15 | End: 2020-08-18

## 2020-08-15 RX ORDER — GLUCAGON INJECTION, SOLUTION 0.5 MG/.1ML
1 INJECTION, SOLUTION SUBCUTANEOUS ONCE
Refills: 0 | Status: DISCONTINUED | OUTPATIENT
Start: 2020-08-15 | End: 2020-08-18

## 2020-08-15 RX ORDER — GABAPENTIN 400 MG/1
100 CAPSULE ORAL EVERY 8 HOURS
Refills: 0 | Status: DISCONTINUED | OUTPATIENT
Start: 2020-08-15 | End: 2020-08-18

## 2020-08-15 RX ORDER — CALCIUM GLUCONATE 100 MG/ML
1 VIAL (ML) INTRAVENOUS ONCE
Refills: 0 | Status: COMPLETED | OUTPATIENT
Start: 2020-08-15 | End: 2020-08-15

## 2020-08-15 RX ADMIN — SODIUM CHLORIDE 1000 MILLILITER(S): 9 INJECTION INTRAMUSCULAR; INTRAVENOUS; SUBCUTANEOUS at 00:31

## 2020-08-15 RX ADMIN — SODIUM CHLORIDE 1000 MILLILITER(S): 9 INJECTION INTRAMUSCULAR; INTRAVENOUS; SUBCUTANEOUS at 02:09

## 2020-08-15 RX ADMIN — PIPERACILLIN AND TAZOBACTAM 3.38 GRAM(S): 4; .5 INJECTION, POWDER, LYOPHILIZED, FOR SOLUTION INTRAVENOUS at 00:31

## 2020-08-15 RX ADMIN — SODIUM CHLORIDE 250 MILLILITER(S): 9 INJECTION, SOLUTION INTRAVENOUS at 05:06

## 2020-08-15 RX ADMIN — Medication 100 GRAM(S): at 01:22

## 2020-08-15 RX ADMIN — SODIUM CHLORIDE 125 MILLILITER(S): 9 INJECTION INTRAMUSCULAR; INTRAVENOUS; SUBCUTANEOUS at 00:32

## 2020-08-15 RX ADMIN — Medication 2: at 06:29

## 2020-08-15 RX ADMIN — Medication 1 MILLIGRAM(S): at 05:04

## 2020-08-15 RX ADMIN — HYDROMORPHONE HYDROCHLORIDE 1 MILLIGRAM(S): 2 INJECTION INTRAMUSCULAR; INTRAVENOUS; SUBCUTANEOUS at 12:46

## 2020-08-15 RX ADMIN — SODIUM CHLORIDE 1000 MILLILITER(S): 9 INJECTION INTRAMUSCULAR; INTRAVENOUS; SUBCUTANEOUS at 01:23

## 2020-08-15 RX ADMIN — HYDROMORPHONE HYDROCHLORIDE 1 MILLIGRAM(S): 2 INJECTION INTRAMUSCULAR; INTRAVENOUS; SUBCUTANEOUS at 05:05

## 2020-08-15 RX ADMIN — ENOXAPARIN SODIUM 40 MILLIGRAM(S): 100 INJECTION SUBCUTANEOUS at 06:00

## 2020-08-15 RX ADMIN — GABAPENTIN 100 MILLIGRAM(S): 400 CAPSULE ORAL at 22:35

## 2020-08-15 RX ADMIN — HYDROMORPHONE HYDROCHLORIDE 1 MILLIGRAM(S): 2 INJECTION INTRAMUSCULAR; INTRAVENOUS; SUBCUTANEOUS at 11:50

## 2020-08-15 RX ADMIN — Medication 105 MILLIGRAM(S): at 05:05

## 2020-08-15 RX ADMIN — HYDROMORPHONE HYDROCHLORIDE 1 MILLIGRAM(S): 2 INJECTION INTRAMUSCULAR; INTRAVENOUS; SUBCUTANEOUS at 05:22

## 2020-08-15 RX ADMIN — Medication 4: at 11:49

## 2020-08-15 RX ADMIN — Medication 50 GRAM(S): at 05:05

## 2020-08-15 RX ADMIN — HYDROMORPHONE HYDROCHLORIDE 1 MILLIGRAM(S): 2 INJECTION INTRAMUSCULAR; INTRAVENOUS; SUBCUTANEOUS at 19:48

## 2020-08-15 RX ADMIN — Medication 25 MILLIGRAM(S): at 00:31

## 2020-08-15 NOTE — H&P ADULT - ATTENDING COMMENTS
30 year old male with anxiety, alcohol abuse with acute interstitial pancreatitis and multiple electrolyte derangements with gastritis. Patient is stable. Patient has not required ativan overnight for withdrawal. Clears started, tolerated well. Replete electrolyte. D/c IVF. Rest as above.

## 2020-08-15 NOTE — H&P ADULT - HISTORY OF PRESENT ILLNESS
***INCOMPLETE NOTE***  30M anxiety, EtOH use disorder (?) presents for 3d of dull, mid-abdominal pain without radiation and 20 episodes of n/v (denies blood) last night. Pt reports drinks two 24oz lime-aritas at bedtime, (also taking 4 "shots" daily, last at 12pm today for his hiccups) since he was 22y.o. Denies tremors/adverse effects of etoh when he stops but feels tremulous today. Has not been able to keep food or water down. Denies other substance use. Denies falls, agitation/parathesias, CP, SOB, palpitations, orthopnea, LE swelling, dizziness, syncope, no fever, chills, night sweats, cough. Denies blood in stool or vomit.     Denies hx of DM/thyroid issues in self or family. Father has HTN, lymphatic cancer (?) in remission. Consents to HIV test.     98.4, -144, -/53, RR 16, 99% on RA  WBC 20, Hb 19, Plt 287, INR 1.5 Na 133, Cl 83, HCO 15, AG 35, BUN 9, Cre 1.5, Glu 304 , lactate 16, AST 76, ALT 51, lipase 650, trig 133, BGB 0.4, pH 7.4, PCO2 21  CT: interstitial edematous pancreatitis, hepatic steatosis/hepatomegaly, no gallstones, mild ascites   ED interventions: Chlorpomazine 25, ativan 1mg, morphine 4mg, zofran 4mg, protonix 40, Zosyn, NS 5L and 125cc/hr 30M y/o PMH of anxiety and EtOH use disorder, presents w/ 3 days of abdominal pain without radiation and ~20 episodes of vomiting last night. Pt reports drinking 2-3 24oz Limearitas everyday at bedtime. Also, reports taking 4 "shots" daily, last at 12pm today for his hiccups. Patient admits to drinking daily since age 22, although intake has increased since the pandemic. Patient hasn't been able to keep any food or water down, and admits to generalized weakness for the past 3 days. Denies any history of EtOH withdrawals, including tremors or anxiety. Also, denies any history of hospitalizations 2/2 EtOH withdrawals. Denies all other substance use. Denies falls, agitation/parathesias, melena, hematochezia, hematemesis, hemoptysis, CP, SOB, palpitations, orthopnea, LE swelling, dizziness, syncope, no fever, chills, night sweats, cough. Currently admits to generalized weakness, abdominal pain, and feeling tremulous. Denies emesis since yesterday.     ED vitals: 98.4, -144, -/53, RR 16, 99% on RA  WBC 20, Hb 19, Plt 287, INR 1.5 Na 133, Cl 83, HCO 15, AG 35, BUN 9, Cre 1.5, Glu 304 , lactate 16, AST 76, ALT 51, lipase 650, trig 133, BGB 0.4, pH 7.4, PCO2 21  CT: interstitial edematous pancreatitis, hepatic steatosis/hepatomegaly, no gallstones, mild ascites   ED interventions: Chlorpromazine 25, Ativan 1mg, Morphine 4mg, Zofran 4mg, Protonix 40, Zosyn, NS 5L and 125cc/hr

## 2020-08-15 NOTE — H&P ADULT - ASSESSMENT
31 y/o M w/ PMH of anxiety, EtOH abuse presents with abd pain x3d and severe vomiting, found to be have acute interstitial pancreatitis and multiple electrolyte derangements 2/2 dehydration and vomiting.

## 2020-08-15 NOTE — H&P ADULT - PROBLEM SELECTOR PLAN 9
F: LR 250cc/hr   E: replete K, Mg, Ca, Phos to goal   N: Advance as tolerated   DVT: SCD, Lovenox   Code: Full

## 2020-08-15 NOTE — H&P ADULT - NSHPLABSRESULTS_GEN_ALL_CORE
LABS:    08-14 @ 23:03 Creatine 1131 U/L<H> [30 - 200]                        13.8   14.67 )-----------( 192      ( 14 Aug 2020 22:49 )             40.0     08-14    138  |  97  |  9   ----------------------------<  207<H>  3.9   |  15<L>  |  1.26    Ca    7.6<L>      14 Aug 2020 23:03    TPro  6.1  /  Alb  3.4  /  TBili  0.9  /  DBili  x   /  AST  76<H>  /  ALT  51<H>  /  AlkPhos  61  08-14    PT/INR - ( 14 Aug 2020 21:20 )   PT: 17.1 sec;   INR: 1.45          PTT - ( 14 Aug 2020 21:20 )  PTT:33.9 sec    IMAGING:  CT Abdomen and Pelvis No Cont (08.14.20 @ 22:35)   Findings:  Evaluation of the abdominopelvic viscera is limited due to noncontrast technique and streak artifact from overlying arms.  Lower chest: No consolidation or significant pleural effusion.  Liver:  Diffuse hepatic steatosis. Mild hepatomegaly. No definite focal lesion.  Gallbladder: No calcified gallstones. Gallbladder sludge. No biliary dilatation.  Spleen:  Normal.  Pancreas:  Pancreas is diffusely enlarged with significant amount of peripancreatic fat stranding and fluid tracking down both lateral aspects of the anterior pararenal space. No drainable/organized collection on this noncontrast examination. No pancreatic duct dilatation.  Adrenal glands:  Normal.  Kidneys: Normal.  Adenopathy:  No lymphadenopathy in abdomen or pelvis.  Ascites: Mild ascites along the right paracolic gutter and within the rectovesical pouch.  Gastrointestinal tract: Evaluation of the GI tract without the use of oral contrast shows an enteric tube with tip in the gastric antrum. No bowel obstruction. Gas-filled ascending and transverse colon. Wall thickening of the descending colon is likely reactive due to adjacent inflammatory changes from the acute pancreatitis. Possible mild wall thickening of the second and third duodenal segments could represent reactive duodenitis. Appendix is normal.  Vessels: Normal.  Pelvic organs: Distended bladder. Normal-sized prostate.  Soft tissues: Normal.  Bones: Normal.    Impression:  Interstitial edematous pancreatitis. No drainable/organized collection on this noncontrast examination. Additional findings as above.

## 2020-08-15 NOTE — H&P ADULT - PROBLEM SELECTOR PLAN 1
Patient with moderate-severe acute interstitial pancreatitis 2/2 EtOH use (trigs neg, no stone) given severe volume depletion, vitals (HR 150s SBP 90s, after fluid resus), Hct 52%, possible BISI. Lactate 16. S/p 5L NS in ED. Tele admit to monitor for severe sequelae: ARDS, DKA.   -Continue w/ LR 250cc/hr for 24-48hr   -Treat hyperglycemia MISS q6hr   -Correct hypocalcemia    -Trend BMP, strict I/O   -Dilaudid 1mg IVP q4hr for pain   -Early feeding when n/v resolves   -Follow up GI recs

## 2020-08-15 NOTE — CONSULT NOTE ADULT - ASSESSMENT
30M w/ PMHx of EtOH use disorder and anxiety p/w 3 days of abdominal pain and vomiting found to have acute interstitial pancreatitis and alcoholic hepatitis    #Acute Interstitial Pancreatitis  Likely 2/2 alcohol use.  Patient with BISAP score of 1.    -Aggressive IVF, 5-10 cc/kg/kg  -Early refeeding   -Pain control per primary team  -Afebrile without abx, continue to monitor leukocytosis and fever curve    #Alcoholic Hepatitis, DF 15  Patient with elevated LT likely from alcohol hepatitis.  Mild elevation with low DF, no role for prednisolone at this time.  Would continue to monitor symptoms and management of withdrawal.  CT finding of hepatomegaly and steatosis consistent with alcoholic hepatitis.  -Encourage alcohol cessation  -Management of withdrawal per primary team  -Continue supplement with folic, mvi, and thiamine    Case d/w svc attg

## 2020-08-15 NOTE — PROGRESS NOTE ADULT - PROBLEM SELECTOR PLAN 6
ED consulted surgery for XR showing suspicion of sigmoid volvulus. Seen by surgery team in ED - no surgical management is warranted at this time. NGT was inserted w/ output of 150 dark fluid.  -NGT removed this AM   -Patient transitioned to clear liquid diet and tolerating well so far

## 2020-08-15 NOTE — H&P ADULT - NSHPSOCIALHISTORY_GEN_ALL_CORE
Smoking          ETOH/Illicit drugs          Occupation Denies tobacco use   Denies use of illicit drugs          Daily alcohol intake since age 22; increased intake since pandemic - 2-3 24oz Limearitas at bedtime and 4 "shots" daily

## 2020-08-15 NOTE — CONSULT NOTE ADULT - SUBJECTIVE AND OBJECTIVE BOX
HPI:  30M w/ PMHx of EtOH use disorder and anxiety p/w 3 days of abdominal pain and vomiting.  Patient reports drinking 2-3 24pz of [place] every week prior to pandemic.  Since being home due to pandemic and some life stressor, he started drinking 2-3 drinks per day with occasional hard liquor.  Patient noted weakness with inability to tolerate PO around 3d ago.  He would have nausea and vomiting.  He subsequently developed abdominal pain, more localized to the left side.  As symptoms persisted, he decided to come to the ED.  Patient reports constipated since 3d prior to admission as he was not able to tolerate anything by mouth.     Started drinking alcohol since age 22, denies tobacco and drug use.  Fam hx of lymphoma, denies stomach ca, pancreatic ca, or colon ca.  No prior surgery.    Allergies    No Known Allergies    Intolerances      Home Medications:    MEDICATIONS:  MEDICATIONS  (STANDING):  dextrose 5%. 1000 milliLiter(s) (50 mL/Hr) IV Continuous <Continuous>  dextrose 50% Injectable 12.5 Gram(s) IV Push once  dextrose 50% Injectable 25 Gram(s) IV Push once  dextrose 50% Injectable 25 Gram(s) IV Push once  enoxaparin Injectable 40 milliGRAM(s) SubCutaneous every 24 hours  folic acid Injectable 1 milliGRAM(s) IV Push every 24 hours  insulin lispro (HumaLOG) corrective regimen sliding scale   SubCutaneous Before meals and at bedtime  thiamine IVPB 500 milliGRAM(s) IV Intermittent every 24 hours    MEDICATIONS  (PRN):  dextrose 40% Gel 15 Gram(s) Oral once PRN Blood Glucose LESS THAN 70 milliGRAM(s)/deciliter  glucagon  Injectable 1 milliGRAM(s) IntraMuscular once PRN Glucose LESS THAN 70 milligrams/deciliter  HYDROmorphone  Injectable 1 milliGRAM(s) IV Push every 4 hours PRN Moderate Pain (4 - 6)  LORazepam   Injectable 2 milliGRAM(s) IV Push every 2 hours PRN CIWA>8    PAST MEDICAL & SURGICAL HISTORY:  Anxiety    FAMILY HISTORY:  FHx: hypothyroidism: Mother w/ history of hypothyroidism.  FHx: lymphoma: Father w/ history of lymphoma.  FH: HTN (hypertension)    SOCIAL HISTORY:  Tobacco:denies  Alcohol:daily use  Illicit Drugs:denies    REVIEW OF SYSTEMS:  All other 10 review of systems is negative except as indicated in HPI    Vital Signs Last 24 Hrs  T(C): 36.8 (15 Aug 2020 14:28), Max: 36.9 (14 Aug 2020 19:44)  T(F): 98.3 (15 Aug 2020 14:28), Max: 98.4 (14 Aug 2020 19:44)  HR: 110 (15 Aug 2020 12:49) (110 - 156)  BP: 160/91 (15 Aug 2020 12:49) (104/53 - 160/91)  BP(mean): 118 (15 Aug 2020 12:49) (108 - 118)  RR: 18 (15 Aug 2020 12:49) (16 - 20)  SpO2: 97% (15 Aug 2020 12:49) (97% - 100%)    08-14 @ 07:01  -  08-15 @ 07:00  --------------------------------------------------------  IN: 1400 mL / OUT: 250 mL / NET: 1150 mL    08-15 @ 07:01  -  08-15 @ 14:44  --------------------------------------------------------  IN: 1250 mL / OUT: 0 mL / NET: 1250 mL        PHYSICAL EXAM:    General: Well developed; well nourished; in no acute distress  Eyes: moist conjunctivae, sclerae anicteric  HENT: Moist mucous membranes, tongue midline  Neck: Trachea midline, supple  Lungs: Normal respiratory effort and no intercostal retractions  Cardiovascular: RRR, S1S2  Abdomen: Soft, mildly ttp of the left abdomen, non-distended; Normal bowel sounds; No rebound or guarding  Extremities: Normal range of motion, No clubbing, cyanosis or edema  Neurological: Alert and oriented x3, tremulous  Skin: Warm and dry.    LABS:                        13.8   15.76 )-----------( 196      ( 15 Aug 2020 06:12 )             41.1     08-15    136  |  96  |  11  ----------------------------<  233<H>  4.7   |  22  |  0.91    Ca    8.5      15 Aug 2020 12:13  Phos  3.2     08-15  Mg     1.9     08-15    TPro  6.0  /  Alb  3.5  /  TBili  1.5<H>  /  DBili  x   /  AST  88<H>  /  ALT  49<H>  /  AlkPhos  59  08-15      Culture Results:   No growth at 12 hours (08-15 @ 00:04)  Culture Results:   No growth at 12 hours (08-15 @ 00:04)    PT/INR - ( 15 Aug 2020 06:12 )   PT: 15.2 sec;   INR: 1.28          PTT - ( 15 Aug 2020 06:12 )  PTT:33.7 sec    Culture - Blood (collected 15 Aug 2020 00:04)  Source: .Blood Blood  Preliminary Report (15 Aug 2020 13:01):    No growth at 12 hours    Culture - Blood (collected 15 Aug 2020 00:04)  Source: .Blood Blood  Preliminary Report (15 Aug 2020 13:01):    No growth at 12 hours      RADIOLOGY & ADDITIONAL STUDIES:   REVIEWED

## 2020-08-15 NOTE — H&P ADULT - PROBLEM SELECTOR PLAN 5
WBC 19 likely reactive/concentrated, s/p zosyn in ED.   -will hold additional abx for now WBC 19 likely reactive/concentrated, s/p zosyn in ED.   -Will hold additional abx for now

## 2020-08-15 NOTE — PROGRESS NOTE ADULT - SUBJECTIVE AND OBJECTIVE BOX
OVERNIGHT EVENTS: no acute events overnight. CIWA was 1 for mild tremor.     SUBJECTIVE / INTERVAL HPI: Patient seen and examined at bedside. Patient reports no new complaints this morning and is feeling better - still endorses some epigastric and L sided abdominal discomfort. Has not had any vomiting since before admission. Denies headaches, chest pain, SOB, N/V/D, agitation, visual or auditory hallucinations. Patient has chronic anxiety and reports feeling anxious from his personal life (job, about to have a baby).  NG tube pulled this morning and diet was advanced to clears.    VITAL SIGNS:  Vital Signs Last 24 Hrs  T(C): 36.8 (15 Aug 2020 14:28), Max: 36.9 (14 Aug 2020 19:44)  T(F): 98.3 (15 Aug 2020 14:28), Max: 98.4 (14 Aug 2020 19:44)  HR: 110 (15 Aug 2020 12:49) (110 - 156)  BP: 160/91 (15 Aug 2020 12:49) (104/53 - 160/91)  BP(mean): 118 (15 Aug 2020 12:49) (108 - 118)  RR: 18 (15 Aug 2020 12:49) (16 - 20)  SpO2: 97% (15 Aug 2020 12:49) (97% - 100%)    PHYSICAL EXAM:    General: resting comfortably in bed, in NAD.   HEENT: NC/AT  Neck: supple  Cardiovascular: +S1/S2; tachycardic, regular rhythm. No murmurs appreciated.   Respiratory: CTA B/L; no W/R/R. No increased work of breathing, patient speaking in full sentences, in no respiratory distress.  Gastrointestinal: mildly distended. No rigidity, guarding, rebound. No tenderness to palpation. Decreased bowel sounds in all 4 quadrants.   Extremities: WWP; no edema, clubbing or cyanosis  Vascular: 2+ radial, DP pulses B/L  Neurological: AAOx3; no focal deficits. Mild b/l hand tremors when outstretched.   CIWA 2 on exam (tremor, anxiety)      MEDICATIONS:  MEDICATIONS  (STANDING):  dextrose 5%. 1000 milliLiter(s) (50 mL/Hr) IV Continuous <Continuous>  dextrose 50% Injectable 12.5 Gram(s) IV Push once  dextrose 50% Injectable 25 Gram(s) IV Push once  dextrose 50% Injectable 25 Gram(s) IV Push once  enoxaparin Injectable 40 milliGRAM(s) SubCutaneous every 24 hours  folic acid Injectable 1 milliGRAM(s) IV Push every 24 hours  insulin lispro (HumaLOG) corrective regimen sliding scale   SubCutaneous Before meals and at bedtime  thiamine IVPB 500 milliGRAM(s) IV Intermittent every 24 hours    MEDICATIONS  (PRN):  dextrose 40% Gel 15 Gram(s) Oral once PRN Blood Glucose LESS THAN 70 milliGRAM(s)/deciliter  glucagon  Injectable 1 milliGRAM(s) IntraMuscular once PRN Glucose LESS THAN 70 milligrams/deciliter  HYDROmorphone  Injectable 1 milliGRAM(s) IV Push every 4 hours PRN Moderate Pain (4 - 6)  LORazepam   Injectable 2 milliGRAM(s) IV Push every 2 hours PRN CIWA>8      ALLERGIES:  Allergies    No Known Allergies    Intolerances        LABS:                        13.8   15.76 )-----------( 196      ( 15 Aug 2020 06:12 )             41.1     08-15    136  |  96  |  11  ----------------------------<  233<H>  4.7   |  22  |  0.91    Ca    8.5      15 Aug 2020 12:13  Phos  3.2     08-15  Mg     1.9     08-15    TPro  6.0  /  Alb  3.5  /  TBili  1.5<H>  /  DBili  x   /  AST  88<H>  /  ALT  49<H>  /  AlkPhos  59  08-15    PT/INR - ( 15 Aug 2020 06:12 )   PT: 15.2 sec;   INR: 1.28          PTT - ( 15 Aug 2020 06:12 )  PTT:33.7 sec  Urinalysis Basic - ( 14 Aug 2020 23:53 )    Color: Yellow / Appearance: Clear / SG: >=1.030 / pH: x  Gluc: x / Ketone: 15 mg/dL  / Bili: Small / Urobili: 0.2 E.U./dL   Blood: x / Protein: 100 mg/dL / Nitrite: NEGATIVE   Leuk Esterase: NEGATIVE / RBC: < 5 /HPF / WBC < 5 /HPF   Sq Epi: x / Non Sq Epi: 0-5 /HPF / Bacteria: Present /HPF      CAPILLARY BLOOD GLUCOSE      POCT Blood Glucose.: 224 mg/dL (15 Aug 2020 11:36)      RADIOLOGY & ADDITIONAL TESTS: Reviewed.  < from: CT Abdomen and Pelvis No Cont (08.14.20 @ 22:35) >  Impression:  Interstitial edematous pancreatitis. No drainable/organized collection on this noncontrast examination. Additional findings as above.    < end of copied text >  < from: Xray Chest 1 View-PORTABLE IMMEDIATE (08.14.20 @ 22:11) >  Impression: No acute infiltrates    < end of copied text >

## 2020-08-15 NOTE — PROGRESS NOTE ADULT - PROBLEM SELECTOR PLAN 1
Patient with moderate-severe acute interstitial pancreatitis 2/2 EtOH use (trigs neg, no stone) given severe volume depletion, vitals (HR 150s SBP 90s, after fluid resus), Hct 52%, possible BISI. Lactate 16. S/p 5L NS in ED. Tele admit to monitor for severe sequelae: ARDS, DKA.   -Hold fluids for now as patient is tolerating clear fluids. If unable to tolerate, will restart fluids   -Treat hyperglycemia MISS q6hr   -Trend BMP, strict I/O   -Dilaudid 1mg IVP q4hr for pain   -NG tube removed --> patient advanced to clear liquid diet and tolerating well.   -Follow up GI recs: c/w aggressive IVF, early refeeding, pain control, folic acid, multivitamin, thiamine    #Elevated lactate:   -Lactate 16 --> 13.6 --> 11 --> 7.6 --> 8.7. Likely in the setting of starvation ketosis 2/2 decreased PO intake and vomiting . Encourage PO   -F/u 4 PM lactate. Restart fluids if still elevated Patient with moderate-severe acute interstitial pancreatitis 2/2 EtOH use (trigs neg, no stone) given severe volume depletion, vitals (HR 150s SBP 90s, after fluid resus), Hct 52%, possible BISI. Lactate 16. S/p 5L NS in ED. Tele admit to monitor for severe sequelae: ARDS, DKA.   -Hold fluids for now as patient is tolerating clear fluids. If unable to tolerate, will restart fluids   -Treat hyperglycemia MISS q6hr   -Trend BMP, strict I/O   -Dilaudid 1mg IVP q4hr for pain   -NG tube removed --> patient advanced to clear liquid diet and tolerating well.   -Follow up GI recs: c/w aggressive IVF, early refeeding, pain control, folic acid, multivitamin, thiamine    #Elevated lactate:   -Lactate 16 --> 13.6 --> 11 --> 7.6 --> 8.7. Likely in the setting of starvation ketosis 2/2 decreased PO intake and vomiting . Encourage PO   -F/u 4 PM lactate. Restart fluids if still elevated  -F/u RUQ U/S today

## 2020-08-15 NOTE — H&P ADULT - PROBLEM SELECTOR PLAN 7
Reports daily drinker (suspect minimizing extent of use). CIWA 4 after ativan/morphine for hand tremor and n/v. Pancreatitis pain may confound CIWA scores.   -High dose thiamine, folic acid, multivitamin replacement   -High risk CIWA protocol   -ETOH cessation counseling  -Offer Psych consult for anxiety   -Ativan 2mg IVP q4hr for now, will reassess need for librium in AM Reports daily drinker (suspect minimizing extent of use). In ED, CIWA of  4 after ativan/morphine for hand tremor and n/v. Pancreatitis pain may confound CIWA scores. Bedside CIWA score of 1 (tremors not visible but felt)  -High dose thiamine, folic acid, multivitamin replacement   -High risk CIWA protocol   -ETOH cessation counseling  -Offer Psych consult for anxiety   -Ativan 2mg IVP q4hr for now, will reassess need for librium in AM

## 2020-08-15 NOTE — H&P ADULT - PROBLEM SELECTOR PLAN 2
Sinus tac in response to severe dehydration/pancreatitis. CT with enlarged heart concern etoh-cardiomyopathy   -AM EKG   -Echo in AM Sinus tac in setting of severe dehydration/pancreatitis. CT with enlarged heart concern for EtOH Cardiomyopathy   -Follow up AM EKG   -TTE in AM  -Monitor on tele

## 2020-08-15 NOTE — CONSULT NOTE ADULT - SUBJECTIVE AND OBJECTIVE BOX
30M anxiety, EtOH use disorder (?) presents for 3d of dull, mid-abdominal pain without radiation and 20 episodes of n/v (denies blood) last night. Pt reports drinks two 24oz lime-aritas at bedtime, (also taking 4 "shots" daily, last at 12pm today for his hiccups) since he was 22y.o. Denies tremors/adverse effects of etoh when he stops but feels tremulous today. Has not been able to keep food or water down. Denies other substance use. Denies falls, agitation/parathesias, CP, SOB, palpitations, orthopnea, LE swelling, dizziness, syncope, no fever, chills, night sweats, cough. Denies blood in stool or vomit.     Denies hx of DM/thyroid issues in self or family. Father has HTN, lymphatic cancer (?) in remission. Consents to HIV test.     98.4, -144, -/53, RR 16, 99% on RA  WBC 20, Hb 19, Plt 287, INR 1.5 Na 133, Cl 83, HCO 15, AG 35, BUN 9, Cre 1.5, Glu 304 , lactate 16, AST 76, ALT 51, lipase 650, trig 133, BGB 0.4, pH 7.4, PCO2 21  CT: interstitial edematous pancreatitis, hepatic steatosis/hepatomegaly, no gallstones, mild ascites   ED interventions: Chlorpomazine 25, ativan 1mg, morphine 4mg, zofran 4mg, protonix 40, Zosyn, NS 5L and 125cc/hr   Patient is a 30y old  Male who presents with a chief complaint of pancreatitis (15 Aug 2020 00:20)      HPI:      Allergies    No Known Allergies    Intolerances      Home Medications:  Lexapro:  (14 Aug 2020 20:27)      SOCIAL HX:     Smoking          ETOH/Illicit drugs          Occupation    PAST MEDICAL & SURGICAL HISTORY:  Anxiety      FAMILY HISTORY:  :    No known cardiovascular or pulmonary family history     ROS:  See HPI     PHYSICAL EXAM    ICU Vital Signs Last 24 Hrs  T(C): 36.9 (14 Aug 2020 19:44), Max: 36.9 (14 Aug 2020 19:44)  T(F): 98.4 (14 Aug 2020 19:44), Max: 98.4 (14 Aug 2020 19:44)  HR: 156 (14 Aug 2020 23:00) (144 - 156)  BP: 104/53 (14 Aug 2020 23:00) (104/53 - 134/60)  BP(mean): --  ABP: --  ABP(mean): --  RR: 18 (14 Aug 2020 23:00) (16 - 20)  SpO2: 99% (14 Aug 2020 23:00) (98% - 99%)      General: Not in distress, sump with dark bilious contents   HEENT:  GEN              Lymphatic system: No LN  Lungs: Bilateral BS  Cardiovascular: Regular  Gastrointestinal: Soft, slight distended, mild TTP over mid abd, BS slight decreased neg murphys   Musculoskeletal: No clubbing.  Moves all extremities.    Skin: Warm.  Intact  Neurological: mild hand tremor, flapping tremor        LABS:                          13.8   14.67 )-----------( 192      ( 14 Aug 2020 22:49 )             40.0                                               08-14    138  |  97  |  9   ----------------------------<  207<H>  3.9   |  15<L>  |  1.26    Ca    7.6<L>      14 Aug 2020 23:03    TPro  6.1  /  Alb  3.4  /  TBili  0.9  /  DBili  x   /  AST  76<H>  /  ALT  51<H>  /  AlkPhos  61  08-14      PT/INR - ( 14 Aug 2020 21:20 )   PT: 17.1 sec;   INR: 1.45          PTT - ( 14 Aug 2020 21:20 )  PTT:33.9 sec                                       Urinalysis Basic - ( 14 Aug 2020 23:53 )    Color: Yellow / Appearance: Clear / SG: >=1.030 / pH: x  Gluc: x / Ketone: 15 mg/dL  / Bili: Small / Urobili: 0.2 E.U./dL   Blood: x / Protein: 100 mg/dL / Nitrite: NEGATIVE   Leuk Esterase: NEGATIVE / RBC: < 5 /HPF / WBC < 5 /HPF   Sq Epi: x / Non Sq Epi: 0-5 /HPF / Bacteria: Present /HPF                                                  LIVER FUNCTIONS - ( 14 Aug 2020 21:20 )  Alb: 3.4 g/dL / Pro: 6.1 g/dL / ALK PHOS: 61 U/L / ALT: 51 U/L / AST: 76 U/L / GGT: x                                                                                                                                       ABG - ( 14 Aug 2020 21:30 )  pH, Arterial: 7.39  pH, Blood: x     /  pCO2: 21    /  pO2: 210   / HCO3: 12    / Base Excess: -10.2 /  SaO2: 100                 CXR:    ECHO:    MEDICATIONS  (STANDING):  sodium chloride 0.9%. 1000 milliLiter(s) (250 mL/Hr) IV Continuous <Continuous>    MEDICATIONS  (PRN):  LORazepam   Injectable 2 milliGRAM(s) IV Push every 2 hours PRN CIWA>8  < from: CT Abdomen and Pelvis No Cont (08.14.20 @ 22:35) >  Lower chest: No consolidation or significant pleural effusion.    Liver:  Diffuse hepatic steatosis. Mild hepatomegaly. No definite focal lesion.    Gallbladder: No calcified gallstones. Gallbladder sludge. No biliary dilatation.    Spleen:  Normal.    Pancreas:  Pancreas is diffusely enlarged with significant amount of peripancreatic fat strandingand fluid tracking down both lateral aspects of the anterior pararenal space. No drainable/organized collection on this noncontrast examination. No pancreatic duct dilatation.    Adrenal glands:  Normal.    Kidneys: Normal.    Adenopathy:  No lymphadenopathy in abdomen or pelvis.    Ascites: Mild ascites along the right paracolic gutter and within the rectovesical pouch.    Gastrointestinal tract: Evaluation of the GI tract without the use of oral contrast shows an enteric tube with tip in the gastric antrum. No bowel obstruction. Gas-filled ascending and transverse colon. Wall thickening of the descending colon is likely reactive due to adjacent inflammatory changes from the acute pancreatitis. Possible mild wall thickening of the second andthird duodenal segments could represent reactive duodenitis. Appendix is normal.      < end of copied text > 30M anxiety, EtOH use disorder (?) presents for 3d of dull, mid-abdominal pain without radiation and 20 episodes of n/v (denies blood) last night. Pt reports drinks two 24oz lime-aritas at bedtime, (also taking 4 "shots" daily, last at 12pm today for his hiccups) since he was 22y.o. Denies tremors/adverse effects of etoh when he stops but feels tremulous today. Has not been able to keep food or water down. Denies other substance use. Denies falls, agitation/parathesias, CP, SOB, palpitations, orthopnea, LE swelling, dizziness, syncope, no fever, chills, night sweats, cough. Denies blood in stool or vomit.     Denies hx of DM/thyroid issues in self or family. Father has HTN, lymphatic cancer (?) in remission. Consents to HIV test.     98.4, -144, -/53, RR 16, 99% on RA  WBC 20, Hb 19, Plt 287, INR 1.5 Na 133, Cl 83, HCO 15, AG 35, BUN 9, Cre 1.5, Glu 304 , lactate 16, AST 76, ALT 51, lipase 650, trig 133, BGB 0.4, pH 7.4, PCO2 21  CT: interstitial edematous pancreatitis, hepatic steatosis/hepatomegaly, no gallstones, mild ascites   ED interventions: Chlorpomazine 25, ativan 1mg, morphine 4mg, zofran 4mg, protonix 40, Zosyn, NS 5L and 125cc/hr     Patient is a 30y old  Male who presents with a chief complaint of pancreatitis (15 Aug 2020 00:20)      HPI:      Allergies    No Known Allergies    Intolerances      Home Medications:  Lexapro:  (14 Aug 2020 20:27)      SOCIAL HX:     Smoking          ETOH/Illicit drugs          Occupation    PAST MEDICAL & SURGICAL HISTORY:  Anxiety      FAMILY HISTORY:  :    No known cardiovascular or pulmonary family history     ROS:  See HPI     PHYSICAL EXAM    ICU Vital Signs Last 24 Hrs  T(C): 36.9 (14 Aug 2020 19:44), Max: 36.9 (14 Aug 2020 19:44)  T(F): 98.4 (14 Aug 2020 19:44), Max: 98.4 (14 Aug 2020 19:44)  HR: 156 (14 Aug 2020 23:00) (144 - 156)  BP: 104/53 (14 Aug 2020 23:00) (104/53 - 134/60)  BP(mean): --  ABP: --  ABP(mean): --  RR: 18 (14 Aug 2020 23:00) (16 - 20)  SpO2: 99% (14 Aug 2020 23:00) (98% - 99%)      General: Not in distress, sump with dark bilious contents   HEENT:  GEN              Lymphatic system: No LN  Lungs: Bilateral BS  Cardiovascular: Regular  Gastrointestinal: Soft, slight distended, mild TTP over mid abd, BS slight decreased neg murphys   Musculoskeletal: No clubbing.  Moves all extremities.    Skin: Warm.  Intact  Neurological: mild hand tremor, flapping tremor        LABS:                          13.8   14.67 )-----------( 192      ( 14 Aug 2020 22:49 )             40.0                                               08-14    138  |  97  |  9   ----------------------------<  207<H>  3.9   |  15<L>  |  1.26    Ca    7.6<L>      14 Aug 2020 23:03    TPro  6.1  /  Alb  3.4  /  TBili  0.9  /  DBili  x   /  AST  76<H>  /  ALT  51<H>  /  AlkPhos  61  08-14      PT/INR - ( 14 Aug 2020 21:20 )   PT: 17.1 sec;   INR: 1.45          PTT - ( 14 Aug 2020 21:20 )  PTT:33.9 sec                                       Urinalysis Basic - ( 14 Aug 2020 23:53 )    Color: Yellow / Appearance: Clear / SG: >=1.030 / pH: x  Gluc: x / Ketone: 15 mg/dL  / Bili: Small / Urobili: 0.2 E.U./dL   Blood: x / Protein: 100 mg/dL / Nitrite: NEGATIVE   Leuk Esterase: NEGATIVE / RBC: < 5 /HPF / WBC < 5 /HPF   Sq Epi: x / Non Sq Epi: 0-5 /HPF / Bacteria: Present /HPF                                                  LIVER FUNCTIONS - ( 14 Aug 2020 21:20 )  Alb: 3.4 g/dL / Pro: 6.1 g/dL / ALK PHOS: 61 U/L / ALT: 51 U/L / AST: 76 U/L / GGT: x                                                                                                                                       ABG - ( 14 Aug 2020 21:30 )  pH, Arterial: 7.39  pH, Blood: x     /  pCO2: 21    /  pO2: 210   / HCO3: 12    / Base Excess: -10.2 /  SaO2: 100                 CXR:    ECHO:    MEDICATIONS  (STANDING):  sodium chloride 0.9%. 1000 milliLiter(s) (250 mL/Hr) IV Continuous <Continuous>    MEDICATIONS  (PRN):  LORazepam   Injectable 2 milliGRAM(s) IV Push every 2 hours PRN CIWA>8  < from: CT Abdomen and Pelvis No Cont (08.14.20 @ 22:35) >  Lower chest: No consolidation or significant pleural effusion.    Liver:  Diffuse hepatic steatosis. Mild hepatomegaly. No definite focal lesion.    Gallbladder: No calcified gallstones. Gallbladder sludge. No biliary dilatation.    Spleen:  Normal.    Pancreas:  Pancreas is diffusely enlarged with significant amount of peripancreatic fat strandingand fluid tracking down both lateral aspects of the anterior pararenal space. No drainable/organized collection on this noncontrast examination. No pancreatic duct dilatation.    Adrenal glands:  Normal.    Kidneys: Normal.    Adenopathy:  No lymphadenopathy in abdomen or pelvis.    Ascites: Mild ascites along the right paracolic gutter and within the rectovesical pouch.    Gastrointestinal tract: Evaluation of the GI tract without the use of oral contrast shows an enteric tube with tip in the gastric antrum. No bowel obstruction. Gas-filled ascending and transverse colon. Wall thickening of the descending colon is likely reactive due to adjacent inflammatory changes from the acute pancreatitis. Possible mild wall thickening of the second andthird duodenal segments could represent reactive duodenitis. Appendix is normal.      < end of copied text >

## 2020-08-15 NOTE — CONSULT NOTE ADULT - ASSESSMENT
30M PMG anxiety, etoh abuse presents with abd pain x3d, severe vomiting found to be have acute interstitial pancreatitis and multiple electrolyte derangements 2/2 dehydration and vomiting.     GASTRO   #Acute pancreatitis   Pt with moderate-severe acute interstitial pancreatitis 2/2 EtoH use (trigs neg, no stone) given severe volume depletion, vitals (HR 150s SBP 90s, after fluid resus), hct 52%, possible BISI. Lactate 16. S/p 5L NS in ED. Tele admit to monitor for severe sequelae: ARDS, DKA. -C/w LR 250cc/hr for 24-48hr   -Treat hyperglycemia MISS q6hr   -Correct hypocalcemia    -Trend BMP, strict i/o   -Dilaudid 1mg IVP q4hr for pain   -Early feeding when n/v resolves   -F/u GI     #Hepatic steatosis/hepatomegaly  INR 1.5, mild tranaminitis   -Trend PT/INR   -RUQ u/s  -ETOH cessation counseling   -F/u GI recs (will see in AM)     #ETOH Abuse   Reports daily drinker (suspect minimizing extent of use).   -High dose thiamine, folic acid, multivitamin replacement   -High risk [facility] protocol   -ETOH cessation counseling  -Offer Psych consult for anxiety   -Ativan 2mg IVP q4hr for now, will reassess need for librium in AM     RENAL   #Mixed AGMA and metabolic alkalaosis   Suspect lactic acidosis 2/2 pre-renal dehydration, metabolic acidosis due to vomiting.   -Improving with fluid resus  -Trend lactate to clear     ENDOCRINE   #Hyperglycemia   Glu 300 on admission, downtrending with fluid resus. BHB 0.4. Trigs 133. No severe acidosis (likey compensated) Likely not DKA/HHS  -F/u AIC  -MISS q6hr    CARDIAC   #Sinus tachycardia   Sinus tac in response to severe dehydration/pancreatitis. CT with enlarged heart concern etoh-cardiomyopathy   -AM EKG   -Echo in AM     PULM   #No active issues. On RA. CT with increased reticular markings.   -Monitor clinically for volume overload/ARDs    INFECTIOUS DISEASE   #Leukocytosis   WBC 19 likely reactive/concentrated, s/p zosyn in ED.   -will hold additional abx for now     F: LR 250cc/hr   E: replete K, Mg, Ca, Phos to goal   N: advance as tolerated   DVT: SCD, Lovenox   Code: full 30M PMG anxiety, etoh abuse presents with abd pain x3d, severe vomiting found to be have acute interstitial pancreatitis and multiple electrolyte derangements 2/2 dehydration and vomiting.     GASTRO   #Acute pancreatitis   Pt with moderate-severe acute interstitial pancreatitis 2/2 EtoH use (trigs neg, no stone) given severe volume depletion, vitals (HR 150s SBP 90s, after fluid resus), hct 52%, possible BISI. Lactate 16. S/p 5L NS in ED. Tele admit to monitor for severe sequelae: ARDS, DKA. -C/w LR 250cc/hr for 24-48hr   -Treat hyperglycemia MISS q6hr   -Correct hypocalcemia    -Trend BMP, strict i/o   -Dilaudid 1mg IVP q4hr for pain   -Early feeding when n/v resolves   -F/u GI     #Hepatic steatosis/hepatomegaly  INR 1.5, mild tranaminitis   -Trend PT/INR   -RUQ u/s  -ETOH cessation counseling   -F/u GI recs (will see in AM)     #ETOH Abuse   Reports daily drinker (suspect minimizing extent of use). CIWA 4 after ativan/morphine for hand tremor and n/v. Pancreatitis pain may confound CIWA scores.   -High dose thiamine, folic acid, multivitamin replacement   -High risk CIWA protocol   -ETOH cessation counseling  -Offer Psych consult for anxiety   -Ativan 2mg IVP q4hr for now, will reassess need for librium in AM     RENAL   #Mixed AGMA and metabolic alkalaosis   Suspect lactic acidosis 2/2 pre-renal dehydration, metabolic acidosis due to vomiting.   -Improving with fluid resus  -Trend lactate to clear     ENDOCRINE   #Hyperglycemia   Glu 300 on admission, downtrending with fluid resus. BHB 0.4. Trigs 133. No severe acidosis (likey compensated) Likely not DKA/HHS  -F/u AIC  -MISS q6hr    CARDIAC   #Sinus tachycardia   Sinus tac in response to severe dehydration/pancreatitis. CT with enlarged heart concern etoh-cardiomyopathy   -AM EKG   -Echo in AM     PULM   #No active issues. On RA. CT with increased reticular markings.   -Monitor clinically for volume overload/ARDs    INFECTIOUS DISEASE   #Leukocytosis   WBC 19 likely reactive/concentrated, s/p zosyn in ED.   -will hold additional abx for now     F: LR 250cc/hr   E: replete K, Mg, Ca, Phos to goal   N: advance as tolerated   DVT: SCD, Lovenox   Code: full

## 2020-08-15 NOTE — H&P ADULT - NSICDXFAMILYHX_GEN_ALL_CORE_FT
FAMILY HISTORY:  FH: HTN (hypertension)  FHx: hypothyroidism, Mother w/ history of hypothyroidism.  FHx: lymphoma, Father w/ history of lymphoma.

## 2020-08-15 NOTE — H&P ADULT - PROBLEM SELECTOR PLAN 4
Mixed AGMA and metabolic alkalaosis   Suspect lactic acidosis 2/2 pre-renal dehydration, metabolic acidosis due to vomiting.   -Improving with fluid resus  -Trend lactate to clear Mixed AGMA and metabolic alkalosis. Suspect lactic acidosis 2/2 pre-renal dehydration, metabolic acidosis due to vomiting.   -Improving with fluid resuscitation   -Trend lactate to clear

## 2020-08-15 NOTE — H&P ADULT - NSHPPHYSICALEXAM_GEN_ALL_CORE
LOS:     VITALS:   T(C): 36.9 (08-14-20 @ 19:44), Max: 36.9 (08-14-20 @ 19:44)  HR: 156 (08-14-20 @ 23:00) (144 - 156)  BP: 104/53 (08-14-20 @ 23:00) (104/53 - 134/60)  RR: 18 (08-14-20 @ 23:00) (16 - 20)  SpO2: 99% (08-14-20 @ 23:00) (98% - 99%)    General: Not in distress  HEENT:  EOMI, PERRLA, conjunctiva and sclera clear           Lymphatic system: No LN  Lungs: Clear to auscultation bilaterally; No rales, rhonchi, wheezing, or rubs. Unlabored respirations  Cardiovascular: Regular rate and rhythm; No murmurs, rubs, or gallops  Gastrointestinal: Soft, slight distended, mild TTP over mid abd, BS slight decreased neg Roberts's. Sump with dark bilious contents   Extremities:  2+ Peripheral Pulses, brisk capillary refill. No clubbing, cyanosis, or edema  Skin: No rashes or lesions   Neurological: Mild hand tremor, flapping tremor LOS:     VITALS:   T(C): 36.9 (08-14-20 @ 19:44), Max: 36.9 (08-14-20 @ 19:44)  HR: 156 (08-14-20 @ 23:00) (144 - 156)  BP: 104/53 (08-14-20 @ 23:00) (104/53 - 134/60)  RR: 18 (08-14-20 @ 23:00) (16 - 20)  SpO2: 99% (08-14-20 @ 23:00) (98% - 99%)    General: Pleasant, no acute distress  HEENT:  EOMI, PERRLA, conjunctiva and sclera clear   Lungs: Clear to auscultation bilaterally; No rales, rhonchi, wheezing, or rubs. Unlabored respirations  Cardiovascular: Tachycardia, regular rhythm; +S1/S2  Gastrointestinal: Distended, epigastric tenderness to deep palpation, decreased BS. Neg Roberts's. Sump in place w/ dark bilious contents  Extremities:  2+ Peripheral Pulses. No clubbing, cyanosis, or edema. Very mild b/l hand tremors w/ outstretched arms   Skin: No rashes or lesions   Neurological: AAOx3, no focal deficits

## 2020-08-15 NOTE — H&P ADULT - PROBLEM/PLAN-2
Mildly to Moderately Impaired: difficulty hearing in some environments or speaker may need to increase volume or speak distinctly/uses hearing aid left ear DISPLAY PLAN FREE TEXT

## 2020-08-15 NOTE — H&P ADULT - PROBLEM SELECTOR PLAN 6
ED consulted surgery for XR showing suspicion of sigmoid volvulus. Seen by surgery team in ED - no surgical management is warranted at this time. NGT was inserted w/ output of 150 dark fluid.  - Bowel rest and maintain NGT to low intermittent suction  - Surgery will continue to follow, recs appreciated ED consulted surgery for XR showing suspicion of sigmoid volvulus. Seen by surgery team in ED - no surgical management is warranted at this time. NGT was inserted w/ output of 150 dark fluid.  -Bowel rest and maintain NGT to low intermittent suction  -Surgery will continue to follow, recs appreciated

## 2020-08-15 NOTE — H&P ADULT - PROBLEM SELECTOR PLAN 3
Glu 300 on admission, downtrending with fluid resus. BHB 0.4. Trigs 133. No severe acidosis (likey compensated) Likely not DKA/HHS  -F/u AIC  -MISS q6hr Glu 300 on admission, downtrending with fluid resus. BHB 0.4. Trigs 133. No severe acidosis (likely compensated) Likely not DKA/HHS.  -Follow up AIC  -MISS q6hr

## 2020-08-15 NOTE — PROGRESS NOTE ADULT - PROBLEM SELECTOR PLAN 3
Glu 300 on admission, downtrending with fluid resus. BHB 0.4. Trigs 133. No severe acidosis (likely compensated).  -Follow up AIC  -MISS q6hr

## 2020-08-15 NOTE — PROGRESS NOTE ADULT - PROBLEM SELECTOR PLAN 2
Sinus tac in setting of severe dehydration/pancreatitis. CT with enlarged heart concern for EtOH Cardiomyopathy   -f/u TTE  -Monitor on tele

## 2020-08-15 NOTE — PROGRESS NOTE ADULT - ASSESSMENT
30M pmh of anxiety, EtOH abuse presents with abd pain x3d, severe vomiting found to be have acute interstitial pancreatitis and multiple electrolyte derangements 2/2 dehydration and vomiting.   ED consulted surgery because of suspicion of sigmoid volvulus. CT A/P demonstrated no evidence of volvulus.     Plan:  - No surgical intervention  - Agree with early enteral nutrition  - Strongly IVF resuscitation   - F/u Abd US  - Surgery will continue to follow 30M pmh of anxiety, EtOH abuse presents with abd pain x3d, severe vomiting found to be have acute interstitial pancreatitis and multiple electrolyte derangements 2/2 dehydration and vomiting.   ED consulted surgery because of suspicion of sigmoid volvulus. CT A/P demonstrated no evidence of volvulus.     Plan:  - No surgical intervention  - Agree with early enteral nutrition  - Strongly recommend IVF resuscitation   - F/u Abd US  - Surgery will continue to follow

## 2020-08-15 NOTE — H&P ADULT - PROBLEM SELECTOR PLAN 8
INR 1.5, mild tranaminitis   -Trend PT/INR   -RUQ u/s  -ETOH cessation counseling   -F/u GI recs (will see in AM) INR 1.5, mild transaminitis   -Trend PT/INR   -RUQ u/s  -ETOH cessation counseling   -F/u GI recs (will see in AM)

## 2020-08-15 NOTE — PROGRESS NOTE ADULT - PROBLEM SELECTOR PLAN 5
WBC 19 likely reactive/concentrated, s/p zosyn in ED. WBC down to 15.76 this AM.   -Will hold additional abx for now  -Continue to trend

## 2020-08-15 NOTE — PROGRESS NOTE ADULT - PROBLEM SELECTOR PLAN 4
Mixed AGMA and metabolic alkalosis. Suspect lactic acidosis 2/2 starvation ketosis 2/2 decreased PO intake and vomiting.   -Patient started on clear liquids this AM  -Trend lactate to clear

## 2020-08-15 NOTE — PROGRESS NOTE ADULT - PROBLEM SELECTOR PLAN 7
Reports daily drinker (suspect minimizing extent of use). In ED, CIWA of  4 after ativan/morphine for hand tremor and n/v. Pancreatitis pain may confound CIWA scores.   -CIWA today 2 (mild  tremor and anxiety)   -High dose thiamine, folic acid, multivitamin replacement   -CIWA protocol   -ETOH cessation counseling  -Offer Psych consult for anxiety   -Ativan 2mg IVP q4hr for now. Continue to monitor patient closely for signs of withdrawal. Low threshold to add Librium

## 2020-08-15 NOTE — PROGRESS NOTE ADULT - SUBJECTIVE AND OBJECTIVE BOX
SUBJECTIVE: Patient seen and examined bedside. Resting in bed with persistent hiccups. Tolerating CLD with nausea or vomiting. Reports voiding several times and passing profuse flatus. Has not had a BM since admission. Has had improvement in abdominal pain, reporting it as periumbilical with some LLQ. Otherwise denies f/c, CP, SOB, melena, hematochezia, weakness or pain in lower extremities.     enoxaparin Injectable 40 milliGRAM(s) SubCutaneous every 24 hours    Vital Signs Last 24 Hrs  T(C): 37.4 (16 Aug 2020 10:00), Max: 37.4 (16 Aug 2020 10:00)  T(F): 99.3 (16 Aug 2020 10:00), Max: 99.3 (16 Aug 2020 10:00)  HR: 108 (16 Aug 2020 08:25) (94 - 110)  BP: 146/101 (16 Aug 2020 08:25) (144/95 - 160/91)  BP(mean): 120 (16 Aug 2020 08:25) (115 - 120)  RR: 18 (16 Aug 2020 03:33) (18 - 18)  SpO2: 93% (16 Aug 2020 08:25) (92% - 98%)  I&O's Detail    15 Aug 2020 07:01  -  16 Aug 2020 07:00  --------------------------------------------------------  IN:    lactated ringers.: 1250 mL  Total IN: 1250 mL    OUT:    Voided: 700 mL  Total OUT: 700 mL    Total NET: 550 mL        General: NAD, resting comfortably in bed  C/V: NSR  Pulm: Nonlabored breathing, no respiratory distress  Abd: soft, mildly distended, mildly tender to palpation in the periumbilical and LLQ. No rebound or guarding  Extrem: WWP, no edema, SCDs in place          LABS:                        11.9   11.14 )-----------( 159      ( 16 Aug 2020 05:59 )             35.1     08-16    134<L>  |  95<L>  |  9   ----------------------------<  136<H>  4.2   |  29  |  0.83    Ca    8.8      16 Aug 2020 05:59  Phos  2.7     08-16  Mg     2.1     08-16    TPro  6.2  /  Alb  3.3  /  TBili  1.5<H>  /  DBili  x   /  AST  131<H>  /  ALT  55<H>  /  AlkPhos  72  08-16    PT/INR - ( 16 Aug 2020 05:59 )   PT: 14.3 sec;   INR: 1.20          PTT - ( 16 Aug 2020 05:59 )  PTT:29.6 sec  Urinalysis Basic - ( 14 Aug 2020 23:53 )    Color: Yellow / Appearance: Clear / SG: >=1.030 / pH: x  Gluc: x / Ketone: 15 mg/dL  / Bili: Small / Urobili: 0.2 E.U./dL   Blood: x / Protein: 100 mg/dL / Nitrite: NEGATIVE   Leuk Esterase: NEGATIVE / RBC: < 5 /HPF / WBC < 5 /HPF   Sq Epi: x / Non Sq Epi: 0-5 /HPF / Bacteria: Present /HPF        RADIOLOGY & ADDITIONAL STUDIES: SUBJECTIVE: Patient seen and examined bedside. Resting in bed with persistent hiccups. Tolerating CLD with nausea or vomiting. Reports voiding several times and passing profuse flatus. Has not had a BM since admission. Has had improvement in abdominal pain, reporting it as periumbilical with some LLQ. Otherwise denies f/c, CP, SOB, melena, hematochezia, weakness or pain in lower extremities.     enoxaparin Injectable 40 milliGRAM(s) SubCutaneous every 24 hours    Vital Signs Last 24 Hrs  T(C): 37.4 (16 Aug 2020 10:00), Max: 37.4 (16 Aug 2020 10:00)  T(F): 99.3 (16 Aug 2020 10:00), Max: 99.3 (16 Aug 2020 10:00)  HR: 108 (16 Aug 2020 08:25) (94 - 110)  BP: 146/101 (16 Aug 2020 08:25) (144/95 - 160/91)  BP(mean): 120 (16 Aug 2020 08:25) (115 - 120)  RR: 18 (16 Aug 2020 03:33) (18 - 18)  SpO2: 93% (16 Aug 2020 08:25) (92% - 98%)  I&O's Detail    15 Aug 2020 07:01  -  16 Aug 2020 07:00  --------------------------------------------------------  IN:    lactated ringers.: 1250 mL  Total IN: 1250 mL    OUT:    Voided: 700 mL  Total OUT: 700 mL    Total NET: 550 mL        General: NAD, resting comfortably in bed  C/V: NSR  Pulm: Nonlabored breathing, no respiratory distress  Abd: soft, mildly distended, mildly tender to palpation in the periumbilical and LLQ. No rebound or guarding  Extrem: WWP, no edema          LABS:                        11.9   11.14 )-----------( 159      ( 16 Aug 2020 05:59 )             35.1     08-16    134<L>  |  95<L>  |  9   ----------------------------<  136<H>  4.2   |  29  |  0.83    Ca    8.8      16 Aug 2020 05:59  Phos  2.7     08-16  Mg     2.1     08-16    TPro  6.2  /  Alb  3.3  /  TBili  1.5<H>  /  DBili  x   /  AST  131<H>  /  ALT  55<H>  /  AlkPhos  72  08-16    PT/INR - ( 16 Aug 2020 05:59 )   PT: 14.3 sec;   INR: 1.20          PTT - ( 16 Aug 2020 05:59 )  PTT:29.6 sec  Urinalysis Basic - ( 14 Aug 2020 23:53 )    Color: Yellow / Appearance: Clear / SG: >=1.030 / pH: x  Gluc: x / Ketone: 15 mg/dL  / Bili: Small / Urobili: 0.2 E.U./dL   Blood: x / Protein: 100 mg/dL / Nitrite: NEGATIVE   Leuk Esterase: NEGATIVE / RBC: < 5 /HPF / WBC < 5 /HPF   Sq Epi: x / Non Sq Epi: 0-5 /HPF / Bacteria: Present /HPF        RADIOLOGY & ADDITIONAL STUDIES:

## 2020-08-16 LAB
ALBUMIN SERPL ELPH-MCNC: 3.3 G/DL — SIGNIFICANT CHANGE UP (ref 3.3–5)
ALP SERPL-CCNC: 72 U/L — SIGNIFICANT CHANGE UP (ref 40–120)
ALT FLD-CCNC: 55 U/L — HIGH (ref 10–45)
ANION GAP SERPL CALC-SCNC: 10 MMOL/L — SIGNIFICANT CHANGE UP (ref 5–17)
ANION GAP SERPL CALC-SCNC: 10 MMOL/L — SIGNIFICANT CHANGE UP (ref 5–17)
ANION GAP SERPL CALC-SCNC: 11 MMOL/L — SIGNIFICANT CHANGE UP (ref 5–17)
APTT BLD: 29.6 SEC — SIGNIFICANT CHANGE UP (ref 27.5–35.5)
AST SERPL-CCNC: 131 U/L — HIGH (ref 10–40)
BILIRUB SERPL-MCNC: 1.5 MG/DL — HIGH (ref 0.2–1.2)
BUN SERPL-MCNC: 6 MG/DL — LOW (ref 7–23)
BUN SERPL-MCNC: 7 MG/DL — SIGNIFICANT CHANGE UP (ref 7–23)
BUN SERPL-MCNC: 9 MG/DL — SIGNIFICANT CHANGE UP (ref 7–23)
CALCIUM SERPL-MCNC: 8.6 MG/DL — SIGNIFICANT CHANGE UP (ref 8.4–10.5)
CALCIUM SERPL-MCNC: 8.8 MG/DL — SIGNIFICANT CHANGE UP (ref 8.4–10.5)
CALCIUM SERPL-MCNC: 9 MG/DL — SIGNIFICANT CHANGE UP (ref 8.4–10.5)
CHLORIDE SERPL-SCNC: 95 MMOL/L — LOW (ref 96–108)
CHLORIDE SERPL-SCNC: 96 MMOL/L — SIGNIFICANT CHANGE UP (ref 96–108)
CHLORIDE SERPL-SCNC: 97 MMOL/L — SIGNIFICANT CHANGE UP (ref 96–108)
CO2 SERPL-SCNC: 29 MMOL/L — SIGNIFICANT CHANGE UP (ref 22–31)
CREAT SERPL-MCNC: 0.78 MG/DL — SIGNIFICANT CHANGE UP (ref 0.5–1.3)
CREAT SERPL-MCNC: 0.8 MG/DL — SIGNIFICANT CHANGE UP (ref 0.5–1.3)
CREAT SERPL-MCNC: 0.83 MG/DL — SIGNIFICANT CHANGE UP (ref 0.5–1.3)
GLUCOSE BLDC GLUCOMTR-MCNC: 136 MG/DL — HIGH (ref 70–99)
GLUCOSE BLDC GLUCOMTR-MCNC: 140 MG/DL — HIGH (ref 70–99)
GLUCOSE BLDC GLUCOMTR-MCNC: 161 MG/DL — HIGH (ref 70–99)
GLUCOSE BLDC GLUCOMTR-MCNC: 193 MG/DL — HIGH (ref 70–99)
GLUCOSE SERPL-MCNC: 136 MG/DL — HIGH (ref 70–99)
GLUCOSE SERPL-MCNC: 161 MG/DL — HIGH (ref 70–99)
GLUCOSE SERPL-MCNC: 162 MG/DL — HIGH (ref 70–99)
HCT VFR BLD CALC: 34.8 % — LOW (ref 39–50)
HCT VFR BLD CALC: 35.1 % — LOW (ref 39–50)
HGB BLD-MCNC: 11.8 G/DL — LOW (ref 13–17)
HGB BLD-MCNC: 11.9 G/DL — LOW (ref 13–17)
INR BLD: 1.2 — HIGH (ref 0.88–1.16)
LACTATE SERPL-SCNC: 1.7 MMOL/L — SIGNIFICANT CHANGE UP (ref 0.5–2)
MAGNESIUM SERPL-MCNC: 2.1 MG/DL — SIGNIFICANT CHANGE UP (ref 1.6–2.6)
MAGNESIUM SERPL-MCNC: 2.1 MG/DL — SIGNIFICANT CHANGE UP (ref 1.6–2.6)
MAGNESIUM SERPL-MCNC: 2.2 MG/DL — SIGNIFICANT CHANGE UP (ref 1.6–2.6)
MCHC RBC-ENTMCNC: 31.6 PG — SIGNIFICANT CHANGE UP (ref 27–34)
MCHC RBC-ENTMCNC: 31.8 PG — SIGNIFICANT CHANGE UP (ref 27–34)
MCHC RBC-ENTMCNC: 33.9 GM/DL — SIGNIFICANT CHANGE UP (ref 32–36)
MCHC RBC-ENTMCNC: 33.9 GM/DL — SIGNIFICANT CHANGE UP (ref 32–36)
MCV RBC AUTO: 93.1 FL — SIGNIFICANT CHANGE UP (ref 80–100)
MCV RBC AUTO: 93.8 FL — SIGNIFICANT CHANGE UP (ref 80–100)
NRBC # BLD: 0 /100 WBCS — SIGNIFICANT CHANGE UP (ref 0–0)
NRBC # BLD: 0 /100 WBCS — SIGNIFICANT CHANGE UP (ref 0–0)
PHOSPHATE SERPL-MCNC: 1.9 MG/DL — LOW (ref 2.5–4.5)
PHOSPHATE SERPL-MCNC: 2.6 MG/DL — SIGNIFICANT CHANGE UP (ref 2.5–4.5)
PHOSPHATE SERPL-MCNC: 2.7 MG/DL — SIGNIFICANT CHANGE UP (ref 2.5–4.5)
PLATELET # BLD AUTO: 151 K/UL — SIGNIFICANT CHANGE UP (ref 150–400)
PLATELET # BLD AUTO: 159 K/UL — SIGNIFICANT CHANGE UP (ref 150–400)
POTASSIUM SERPL-MCNC: 3.7 MMOL/L — SIGNIFICANT CHANGE UP (ref 3.5–5.3)
POTASSIUM SERPL-MCNC: 4 MMOL/L — SIGNIFICANT CHANGE UP (ref 3.5–5.3)
POTASSIUM SERPL-MCNC: 4.2 MMOL/L — SIGNIFICANT CHANGE UP (ref 3.5–5.3)
POTASSIUM SERPL-SCNC: 3.7 MMOL/L — SIGNIFICANT CHANGE UP (ref 3.5–5.3)
POTASSIUM SERPL-SCNC: 4 MMOL/L — SIGNIFICANT CHANGE UP (ref 3.5–5.3)
POTASSIUM SERPL-SCNC: 4.2 MMOL/L — SIGNIFICANT CHANGE UP (ref 3.5–5.3)
PROT SERPL-MCNC: 6.2 G/DL — SIGNIFICANT CHANGE UP (ref 6–8.3)
PROTHROM AB SERPL-ACNC: 14.3 SEC — HIGH (ref 10.6–13.6)
RBC # BLD: 3.71 M/UL — LOW (ref 4.2–5.8)
RBC # BLD: 3.77 M/UL — LOW (ref 4.2–5.8)
RBC # FLD: 14.5 % — SIGNIFICANT CHANGE UP (ref 10.3–14.5)
RBC # FLD: 14.7 % — HIGH (ref 10.3–14.5)
SODIUM SERPL-SCNC: 134 MMOL/L — LOW (ref 135–145)
SODIUM SERPL-SCNC: 135 MMOL/L — SIGNIFICANT CHANGE UP (ref 135–145)
SODIUM SERPL-SCNC: 137 MMOL/L — SIGNIFICANT CHANGE UP (ref 135–145)
WBC # BLD: 11.14 K/UL — HIGH (ref 3.8–10.5)
WBC # BLD: 9.6 K/UL — SIGNIFICANT CHANGE UP (ref 3.8–10.5)
WBC # FLD AUTO: 11.14 K/UL — HIGH (ref 3.8–10.5)
WBC # FLD AUTO: 9.6 K/UL — SIGNIFICANT CHANGE UP (ref 3.8–10.5)

## 2020-08-16 PROCEDURE — 99233 SBSQ HOSP IP/OBS HIGH 50: CPT | Mod: GC

## 2020-08-16 PROCEDURE — 99232 SBSQ HOSP IP/OBS MODERATE 35: CPT

## 2020-08-16 PROCEDURE — 99222 1ST HOSP IP/OBS MODERATE 55: CPT

## 2020-08-16 PROCEDURE — 71045 X-RAY EXAM CHEST 1 VIEW: CPT | Mod: 26

## 2020-08-16 RX ORDER — CHLORPROMAZINE HCL 10 MG
25 TABLET ORAL EVERY 8 HOURS
Refills: 0 | Status: DISCONTINUED | OUTPATIENT
Start: 2020-08-16 | End: 2020-08-18

## 2020-08-16 RX ORDER — POTASSIUM CHLORIDE 20 MEQ
20 PACKET (EA) ORAL ONCE
Refills: 0 | Status: COMPLETED | OUTPATIENT
Start: 2020-08-16 | End: 2020-08-16

## 2020-08-16 RX ORDER — THIAMINE MONONITRATE (VIT B1) 100 MG
250 TABLET ORAL DAILY
Refills: 0 | Status: DISCONTINUED | OUTPATIENT
Start: 2020-08-18 | End: 2020-08-18

## 2020-08-16 RX ORDER — HYDROMORPHONE HYDROCHLORIDE 2 MG/ML
1 INJECTION INTRAMUSCULAR; INTRAVENOUS; SUBCUTANEOUS ONCE
Refills: 0 | Status: DISCONTINUED | OUTPATIENT
Start: 2020-08-16 | End: 2020-08-16

## 2020-08-16 RX ORDER — LANOLIN ALCOHOL/MO/W.PET/CERES
5 CREAM (GRAM) TOPICAL AT BEDTIME
Refills: 0 | Status: DISCONTINUED | OUTPATIENT
Start: 2020-08-16 | End: 2020-08-18

## 2020-08-16 RX ORDER — SODIUM CHLORIDE 9 MG/ML
1000 INJECTION INTRAMUSCULAR; INTRAVENOUS; SUBCUTANEOUS
Refills: 0 | Status: DISCONTINUED | OUTPATIENT
Start: 2020-08-16 | End: 2020-08-16

## 2020-08-16 RX ORDER — SODIUM CHLORIDE 9 MG/ML
1000 INJECTION INTRAMUSCULAR; INTRAVENOUS; SUBCUTANEOUS
Refills: 0 | Status: DISCONTINUED | OUTPATIENT
Start: 2020-08-16 | End: 2020-08-18

## 2020-08-16 RX ORDER — HYDROMORPHONE HYDROCHLORIDE 2 MG/ML
0.5 INJECTION INTRAMUSCULAR; INTRAVENOUS; SUBCUTANEOUS EVERY 6 HOURS
Refills: 0 | Status: DISCONTINUED | OUTPATIENT
Start: 2020-08-16 | End: 2020-08-17

## 2020-08-16 RX ORDER — HYDROMORPHONE HYDROCHLORIDE 2 MG/ML
1 INJECTION INTRAMUSCULAR; INTRAVENOUS; SUBCUTANEOUS EVERY 6 HOURS
Refills: 0 | Status: DISCONTINUED | OUTPATIENT
Start: 2020-08-16 | End: 2020-08-16

## 2020-08-16 RX ORDER — HYDROMORPHONE HYDROCHLORIDE 2 MG/ML
1 INJECTION INTRAMUSCULAR; INTRAVENOUS; SUBCUTANEOUS EVERY 4 HOURS
Refills: 0 | Status: DISCONTINUED | OUTPATIENT
Start: 2020-08-16 | End: 2020-08-17

## 2020-08-16 RX ADMIN — HYDROMORPHONE HYDROCHLORIDE 1 MILLIGRAM(S): 2 INJECTION INTRAMUSCULAR; INTRAVENOUS; SUBCUTANEOUS at 00:51

## 2020-08-16 RX ADMIN — SODIUM CHLORIDE 200 MILLILITER(S): 9 INJECTION INTRAMUSCULAR; INTRAVENOUS; SUBCUTANEOUS at 18:01

## 2020-08-16 RX ADMIN — GABAPENTIN 100 MILLIGRAM(S): 400 CAPSULE ORAL at 10:00

## 2020-08-16 RX ADMIN — HYDROMORPHONE HYDROCHLORIDE 1 MILLIGRAM(S): 2 INJECTION INTRAMUSCULAR; INTRAVENOUS; SUBCUTANEOUS at 22:16

## 2020-08-16 RX ADMIN — HYDROMORPHONE HYDROCHLORIDE 1 MILLIGRAM(S): 2 INJECTION INTRAMUSCULAR; INTRAVENOUS; SUBCUTANEOUS at 14:40

## 2020-08-16 RX ADMIN — Medication 1 TABLET(S): at 11:13

## 2020-08-16 RX ADMIN — Medication 2: at 22:49

## 2020-08-16 RX ADMIN — GABAPENTIN 100 MILLIGRAM(S): 400 CAPSULE ORAL at 19:12

## 2020-08-16 RX ADMIN — HYDROMORPHONE HYDROCHLORIDE 1 MILLIGRAM(S): 2 INJECTION INTRAMUSCULAR; INTRAVENOUS; SUBCUTANEOUS at 03:56

## 2020-08-16 RX ADMIN — Medication 1 MILLIGRAM(S): at 05:59

## 2020-08-16 RX ADMIN — HYDROMORPHONE HYDROCHLORIDE 1 MILLIGRAM(S): 2 INJECTION INTRAMUSCULAR; INTRAVENOUS; SUBCUTANEOUS at 10:47

## 2020-08-16 RX ADMIN — SODIUM CHLORIDE 150 MILLILITER(S): 9 INJECTION INTRAMUSCULAR; INTRAVENOUS; SUBCUTANEOUS at 15:50

## 2020-08-16 RX ADMIN — Medication 25 MILLIGRAM(S): at 11:12

## 2020-08-16 RX ADMIN — Medication 2: at 12:31

## 2020-08-16 RX ADMIN — HYDROMORPHONE HYDROCHLORIDE 1 MILLIGRAM(S): 2 INJECTION INTRAMUSCULAR; INTRAVENOUS; SUBCUTANEOUS at 13:40

## 2020-08-16 RX ADMIN — HYDROMORPHONE HYDROCHLORIDE 1 MILLIGRAM(S): 2 INJECTION INTRAMUSCULAR; INTRAVENOUS; SUBCUTANEOUS at 22:40

## 2020-08-16 RX ADMIN — Medication 5 MILLIGRAM(S): at 03:56

## 2020-08-16 RX ADMIN — HYDROMORPHONE HYDROCHLORIDE 1 MILLIGRAM(S): 2 INJECTION INTRAMUSCULAR; INTRAVENOUS; SUBCUTANEOUS at 01:30

## 2020-08-16 RX ADMIN — SODIUM CHLORIDE 200 MILLILITER(S): 9 INJECTION INTRAMUSCULAR; INTRAVENOUS; SUBCUTANEOUS at 21:34

## 2020-08-16 RX ADMIN — Medication 25 MILLIGRAM(S): at 19:12

## 2020-08-16 RX ADMIN — ENOXAPARIN SODIUM 40 MILLIGRAM(S): 100 INJECTION SUBCUTANEOUS at 05:11

## 2020-08-16 RX ADMIN — HYDROMORPHONE HYDROCHLORIDE 1 MILLIGRAM(S): 2 INJECTION INTRAMUSCULAR; INTRAVENOUS; SUBCUTANEOUS at 18:15

## 2020-08-16 RX ADMIN — Medication 5 MILLIGRAM(S): at 21:34

## 2020-08-16 RX ADMIN — HYDROMORPHONE HYDROCHLORIDE 1 MILLIGRAM(S): 2 INJECTION INTRAMUSCULAR; INTRAVENOUS; SUBCUTANEOUS at 18:00

## 2020-08-16 RX ADMIN — HYDROMORPHONE HYDROCHLORIDE 1 MILLIGRAM(S): 2 INJECTION INTRAMUSCULAR; INTRAVENOUS; SUBCUTANEOUS at 10:00

## 2020-08-16 RX ADMIN — Medication 105 MILLIGRAM(S): at 05:11

## 2020-08-16 NOTE — PROGRESS NOTE ADULT - SUBJECTIVE AND OBJECTIVE BOX
TRANSFER NOTE: [facility] accepting from [facility] tele    SUBJECTIVE / INTERVAL HPI: Patient seen and examined at bedside.     VITAL SIGNS:  Vital Signs Last 24 Hrs  T(C): 37.4 (16 Aug 2020 10:00), Max: 37.4 (16 Aug 2020 10:00)  T(F): 99.3 (16 Aug 2020 10:00), Max: 99.3 (16 Aug 2020 10:00)  HR: 108 (16 Aug 2020 08:25) (94 - 110)  BP: 146/101 (16 Aug 2020 08:25) (144/95 - 160/91)  BP(mean): 120 (16 Aug 2020 08:25) (115 - 120)  RR: 18 (16 Aug 2020 03:33) (18 - 18)  SpO2: 93% (16 Aug 2020 08:25) (92% - 98%)    REVIEW OF SYSTEMS:  CONSTITUTIONAL: No weakness, fevers or chills.   EYES/ENT: No visual changes;  No vertigo or throat pain   NECK: No pain or stiffness  RESPIRATORY: No cough, wheezing, hemoptysis; No shortness of breath  CARDIOVASCULAR: No chest pain or palpitations  GASTROINTESTINAL: No abdominal or epigastric pain. No nausea, vomiting, or hematemesis; No diarrhea or constipation. No melena or hematochezia.  GENITOURINARY: No dysuria, frequency or hematuria  NEUROLOGICAL: No numbness or weakness  SKIN: No itching, burning, rashes, or lesions   All other review of systems is negative unless indicated above.    PHYSICAL EXAM:  General: WDWN male in mild discomfort. Frequently coughing and hiccuping.   HEENT: NC/AT; PERRL, clear conjunctiva  Neck: supple, no JVD  Cardiovascular: +S1/S2; RRR, no M/R/G  Respiratory: CTA b/l; no W/R/R  Gastrointestinal: soft, NT/ND; +BSx4  Extremities: WWP; 2+ peripheral pulses; no edema   Neurological: AAOx3; no focal deficits    MEDICATIONS:  MEDICATIONS  (STANDING):  chlorproMAZINE    Tablet 25 milliGRAM(s) Oral every 8 hours  dextrose 5%. 1000 milliLiter(s) (50 mL/Hr) IV Continuous <Continuous>  dextrose 50% Injectable 12.5 Gram(s) IV Push once  dextrose 50% Injectable 25 Gram(s) IV Push once  dextrose 50% Injectable 25 Gram(s) IV Push once  enoxaparin Injectable 40 milliGRAM(s) SubCutaneous every 24 hours  folic acid Injectable 1 milliGRAM(s) IV Push every 24 hours  insulin lispro (HumaLOG) corrective regimen sliding scale   SubCutaneous Before meals and at bedtime  melatonin 5 milliGRAM(s) Oral at bedtime  multivitamin 1 Tablet(s) Oral daily  thiamine IVPB 500 milliGRAM(s) IV Intermittent every 24 hours    MEDICATIONS  (PRN):  dextrose 40% Gel 15 Gram(s) Oral once PRN Blood Glucose LESS THAN 70 milliGRAM(s)/deciliter  gabapentin 100 milliGRAM(s) Oral every 8 hours PRN Hiccups  glucagon  Injectable 1 milliGRAM(s) IntraMuscular once PRN Glucose LESS THAN 70 milligrams/deciliter  HYDROmorphone  Injectable 0.5 milliGRAM(s) IV Push every 6 hours PRN Moderate Pain (4 - 6)  HYDROmorphone  Injectable 1 milliGRAM(s) IV Push every 6 hours PRN Severe Pain (7 - 10)  LORazepam   Injectable 2 milliGRAM(s) IV Push every 2 hours PRN CIWA>8      ALLERGIES:  Allergies    No Known Allergies    Intolerances        LABS:                        11.9   11.14 )-----------( 159      ( 16 Aug 2020 05:59 )             35.1     08-16    134<L>  |  95<L>  |  9   ----------------------------<  136<H>  4.2   |  29  |  0.83    Ca    8.8      16 Aug 2020 05:59  Phos  2.7     08-16  Mg     2.1     08-16    TPro  6.2  /  Alb  3.3  /  TBili  1.5<H>  /  DBili  x   /  AST  131<H>  /  ALT  55<H>  /  AlkPhos  72  08-16    PT/INR - ( 16 Aug 2020 05:59 )   PT: 14.3 sec;   INR: 1.20          PTT - ( 16 Aug 2020 05:59 )  PTT:29.6 sec  Urinalysis Basic - ( 14 Aug 2020 23:53 )    Color: Yellow / Appearance: Clear / SG: >=1.030 / pH: x  Gluc: x / Ketone: 15 mg/dL  / Bili: Small / Urobili: 0.2 E.U./dL   Blood: x / Protein: 100 mg/dL / Nitrite: NEGATIVE   Leuk Esterase: NEGATIVE / RBC: < 5 /HPF / WBC < 5 /HPF   Sq Epi: x / Non Sq Epi: 0-5 /HPF / Bacteria: Present /HPF      CAPILLARY BLOOD GLUCOSE      POCT Blood Glucose.: 140 mg/dL (16 Aug 2020 06:31)      RADIOLOGY & ADDITIONAL TESTS: Reviewed. TRANSFER NOTE: F accepting from [facility] tele    SUBJECTIVE / INTERVAL HPI: Patient seen and examined at bedside. This morning he complains of frequent hiccuping, states his pain is well controlled on dilaudid which he has been utilizing frequently. He denies any CIWA symptoms and states he has been tolerating food well.     VITAL SIGNS:  Vital Signs Last 24 Hrs  T(C): 37.4 (16 Aug 2020 10:00), Max: 37.4 (16 Aug 2020 10:00)  T(F): 99.3 (16 Aug 2020 10:00), Max: 99.3 (16 Aug 2020 10:00)  HR: 108 (16 Aug 2020 08:25) (94 - 110)  BP: 146/101 (16 Aug 2020 08:25) (144/95 - 160/91)  BP(mean): 120 (16 Aug 2020 08:25) (115 - 120)  RR: 18 (16 Aug 2020 03:33) (18 - 18)  SpO2: 93% (16 Aug 2020 08:25) (92% - 98%)    REVIEW OF SYSTEMS:  CONSTITUTIONAL: No weakness, fevers or chills.   EYES/ENT: No visual changes;  No vertigo or throat pain   NECK: No pain or stiffness  RESPIRATORY: +hiccups, +cough, wheezing, hemoptysis; No shortness of breath  CARDIOVASCULAR: No chest pain or palpitations  GASTROINTESTINAL: +Abdominal discomfort. No nausea, vomiting, or hematemesis; No diarrhea or constipation. No melena or hematochezia.  GENITOURINARY: No dysuria, frequency or hematuria  NEUROLOGICAL: No numbness or weakness  SKIN: No itching, burning, rashes, or lesions   All other review of systems is negative unless indicated above.    PHYSICAL EXAM:  General: WDWN male in mild discomfort. Frequently coughing and hiccuping.   HEENT: NC/AT; PERRL, clear conjunctiva. Dry mucous membranes  Neck: supple, no JVD  Cardiovascular: +S1/S2; Tachycardic with regular rhythm, no M/R/G  Respiratory: CTA b/l; no W/R/R. Frequenlty coughing/hiccuping but no crackles/rales/rhonchi/wheezing heard. No accessory muscle use.   Gastrointestinal: soft, NT/ND; +BSx4  Extremities: WWP; 2+ peripheral pulses; Trace b/l LE pitting edema.   Neurological: AAOx3; no focal deficits    MEDICATIONS:  MEDICATIONS  (STANDING):  chlorproMAZINE    Tablet 25 milliGRAM(s) Oral every 8 hours  dextrose 5%. 1000 milliLiter(s) (50 mL/Hr) IV Continuous <Continuous>  dextrose 50% Injectable 12.5 Gram(s) IV Push once  dextrose 50% Injectable 25 Gram(s) IV Push once  dextrose 50% Injectable 25 Gram(s) IV Push once  enoxaparin Injectable 40 milliGRAM(s) SubCutaneous every 24 hours  folic acid Injectable 1 milliGRAM(s) IV Push every 24 hours  insulin lispro (HumaLOG) corrective regimen sliding scale   SubCutaneous Before meals and at bedtime  melatonin 5 milliGRAM(s) Oral at bedtime  multivitamin 1 Tablet(s) Oral daily  thiamine IVPB 500 milliGRAM(s) IV Intermittent every 24 hours    MEDICATIONS  (PRN):  dextrose 40% Gel 15 Gram(s) Oral once PRN Blood Glucose LESS THAN 70 milliGRAM(s)/deciliter  gabapentin 100 milliGRAM(s) Oral every 8 hours PRN Hiccups  glucagon  Injectable 1 milliGRAM(s) IntraMuscular once PRN Glucose LESS THAN 70 milligrams/deciliter  HYDROmorphone  Injectable 0.5 milliGRAM(s) IV Push every 6 hours PRN Moderate Pain (4 - 6)  HYDROmorphone  Injectable 1 milliGRAM(s) IV Push every 6 hours PRN Severe Pain (7 - 10)  LORazepam   Injectable 2 milliGRAM(s) IV Push every 2 hours PRN CIWA>8      ALLERGIES:  Allergies    No Known Allergies    Intolerances        LABS:                        11.9   11.14 )-----------( 159      ( 16 Aug 2020 05:59 )             35.1     08-16    134<L>  |  95<L>  |  9   ----------------------------<  136<H>  4.2   |  29  |  0.83    Ca    8.8      16 Aug 2020 05:59  Phos  2.7     08-16  Mg     2.1     08-16    TPro  6.2  /  Alb  3.3  /  TBili  1.5<H>  /  DBili  x   /  AST  131<H>  /  ALT  55<H>  /  AlkPhos  72  08-16    PT/INR - ( 16 Aug 2020 05:59 )   PT: 14.3 sec;   INR: 1.20          PTT - ( 16 Aug 2020 05:59 )  PTT:29.6 sec  Urinalysis Basic - ( 14 Aug 2020 23:53 )    Color: Yellow / Appearance: Clear / SG: >=1.030 / pH: x  Gluc: x / Ketone: 15 mg/dL  / Bili: Small / Urobili: 0.2 E.U./dL   Blood: x / Protein: 100 mg/dL / Nitrite: NEGATIVE   Leuk Esterase: NEGATIVE / RBC: < 5 /HPF / WBC < 5 /HPF   Sq Epi: x / Non Sq Epi: 0-5 /HPF / Bacteria: Present /HPF      CAPILLARY BLOOD GLUCOSE      POCT Blood Glucose.: 140 mg/dL (16 Aug 2020 06:31)      RADIOLOGY & ADDITIONAL TESTS: Reviewed.

## 2020-08-16 NOTE — PROGRESS NOTE ADULT - PROBLEM SELECTOR PLAN 4
Mixed AGMA and metabolic alkalosis. Suspect lactic acidosis 2/2 starvation ketosis 2/2 decreased PO intake and vomiting.   - Advancing diet as tolerated

## 2020-08-16 NOTE — PROGRESS NOTE ADULT - PROBLEM SELECTOR PLAN 6
ED consulted surgery for XR showing suspicion of sigmoid volvulus. Seen by surgery team in ED - no surgical management is warranted at this time. NGT was inserted w/ output of 150 dark fluid.  - NGT removed this AM   - Patient transitioned to clear liquid diet and tolerating well so far  - Patient with mild diffuse abdominal pain and distention but does state 4 normal bowel movements yesterday. Consider portable AXR if worsening.

## 2020-08-16 NOTE — PROGRESS NOTE ADULT - ATTENDING COMMENTS
- Pancreatitis: Minimal abdominal pain, Tolerated clear liquids well. Advance diet.  - Sinus Tachycardia, stable  - No s/s of alcohol withdrawal at present  - Transfer to floor  - Rest as above

## 2020-08-16 NOTE — PROGRESS NOTE ADULT - SUBJECTIVE AND OBJECTIVE BOX
SUBJECTIVE/OVERNIGHT EVENTS:    VITAL SIGNS:  Vital Signs Last 24 Hrs  T(C): 36.8 (16 Aug 2020 06:00), Max: 37.2 (15 Aug 2020 19:23)  T(F): 98.3 (16 Aug 2020 06:00), Max: 98.9 (15 Aug 2020 19:23)  HR: 108 (16 Aug 2020 08:25) (94 - 110)  BP: 146/101 (16 Aug 2020 08:25) (144/95 - 160/91)  BP(mean): 120 (16 Aug 2020 08:25) (115 - 120)  RR: 18 (16 Aug 2020 03:33) (18 - 18)  SpO2: 93% (16 Aug 2020 08:25) (92% - 98%)    PHYSICAL EXAM:  General: NAD; speaking in full sentences  HEENT: NC/AT; PERRL; EOMI; MMM  Neck: supple; no JVD  Cardiac: RRR; +S1/S2  Pulm: CTA B/L; no W/R/R  GI: soft, NT/ND, +BSx4  Extremities: WWP; no edema, clubbing or cyanosis  Vasc: 2+ radial, DP/PT pulses B/L  Neuro: AAOx3; no focal deficits    MEDICATIONS:  MEDICATIONS  (STANDING):  chlorproMAZINE    Tablet 25 milliGRAM(s) Oral every 8 hours  dextrose 5%. 1000 milliLiter(s) (50 mL/Hr) IV Continuous <Continuous>  dextrose 50% Injectable 12.5 Gram(s) IV Push once  dextrose 50% Injectable 25 Gram(s) IV Push once  dextrose 50% Injectable 25 Gram(s) IV Push once  enoxaparin Injectable 40 milliGRAM(s) SubCutaneous every 24 hours  folic acid Injectable 1 milliGRAM(s) IV Push every 24 hours  insulin lispro (HumaLOG) corrective regimen sliding scale   SubCutaneous Before meals and at bedtime  melatonin 5 milliGRAM(s) Oral at bedtime  thiamine IVPB 500 milliGRAM(s) IV Intermittent every 24 hours    MEDICATIONS  (PRN):  dextrose 40% Gel 15 Gram(s) Oral once PRN Blood Glucose LESS THAN 70 milliGRAM(s)/deciliter  gabapentin 100 milliGRAM(s) Oral every 8 hours PRN Hiccups  glucagon  Injectable 1 milliGRAM(s) IntraMuscular once PRN Glucose LESS THAN 70 milligrams/deciliter  HYDROmorphone  Injectable 1 milliGRAM(s) IV Push every 4 hours PRN Moderate Pain (4 - 6)  LORazepam   Injectable 2 milliGRAM(s) IV Push every 2 hours PRN CIWA>8      ALLERGIES:  Allergies    No Known Allergies    Intolerances        LABS:                        11.9   11.14 )-----------( 159      ( 16 Aug 2020 05:59 )             35.1     08-16    134<L>  |  95<L>  |  9   ----------------------------<  136<H>  4.2   |  29  |  0.83    Ca    8.8      16 Aug 2020 05:59  Phos  2.7     08-16  Mg     2.1     08-16    TPro  6.2  /  Alb  3.3  /  TBili  1.5<H>  /  DBili  x   /  AST  131<H>  /  ALT  55<H>  /  AlkPhos  72  08-16    PT/INR - ( 16 Aug 2020 05:59 )   PT: 14.3 sec;   INR: 1.20          PTT - ( 16 Aug 2020 05:59 )  PTT:29.6 sec    RADIOLOGY & ADDITIONAL TESTS: Reviewed. Hospital Course:  Pt is a 31 yo M with PMH anxiety and alcohol abuse who p/f severe abdominal pain, distention, N/V x3d with inability to tolerate PO. On arrival, , bicarb 15, AG 35, lactate 16, WBC 19, lipase 645, glu 304, XR with c/f sigmoid volvulus. Surgery consulted and placed NGT with 150cc dark fluid return. GI consulted with recommendation of supportive therapy. Pt given chlorpromazine, zosyn, 5L NS and started on mIVF. Last drink evening of arrival, typically 4 shots of vodka a day and limearitas. CIWA protocol started but pt has not required ativan or librium. Lactate has downtrended and pt now tolerating clear liquids with advance to regular diet today. CT abd/pel c/w pancreatitis, hepatic steatosis, HSM, and mild ascites. Now pending RUQ US and TTE with c/f alcoholic cardiomegaly. Pt with persistent hiccups since admission, started on gabapentin PRN without relief and started on thorazine today. Pain has significantly improved, as has N/V, and pt is now stable for s/d [facility] for further observation and management.      SUBJECTIVE/OVERNIGHT EVENTS:    VITAL SIGNS:  Vital Signs Last 24 Hrs  T(C): 36.8 (16 Aug 2020 06:00), Max: 37.2 (15 Aug 2020 19:23)  T(F): 98.3 (16 Aug 2020 06:00), Max: 98.9 (15 Aug 2020 19:23)  HR: 108 (16 Aug 2020 08:25) (94 - 110)  BP: 146/101 (16 Aug 2020 08:25) (144/95 - 160/91)  BP(mean): 120 (16 Aug 2020 08:25) (115 - 120)  RR: 18 (16 Aug 2020 03:33) (18 - 18)  SpO2: 93% (16 Aug 2020 08:25) (92% - 98%)    PHYSICAL EXAM:  General: NAD; speaking in full sentences  HEENT: NC/AT; PERRL; EOMI; MMM  Neck: supple; no JVD  Cardiac: RRR; +S1/S2  Pulm: CTA B/L; no W/R/R  GI: soft, NT/ND, +BSx4  Extremities: WWP; no edema, clubbing or cyanosis  Vasc: 2+ radial, DP/PT pulses B/L  Neuro: AAOx3; no focal deficits    MEDICATIONS:  MEDICATIONS  (STANDING):  chlorproMAZINE    Tablet 25 milliGRAM(s) Oral every 8 hours  dextrose 5%. 1000 milliLiter(s) (50 mL/Hr) IV Continuous <Continuous>  dextrose 50% Injectable 12.5 Gram(s) IV Push once  dextrose 50% Injectable 25 Gram(s) IV Push once  dextrose 50% Injectable 25 Gram(s) IV Push once  enoxaparin Injectable 40 milliGRAM(s) SubCutaneous every 24 hours  folic acid Injectable 1 milliGRAM(s) IV Push every 24 hours  insulin lispro (HumaLOG) corrective regimen sliding scale   SubCutaneous Before meals and at bedtime  melatonin 5 milliGRAM(s) Oral at bedtime  thiamine IVPB 500 milliGRAM(s) IV Intermittent every 24 hours    MEDICATIONS  (PRN):  dextrose 40% Gel 15 Gram(s) Oral once PRN Blood Glucose LESS THAN 70 milliGRAM(s)/deciliter  gabapentin 100 milliGRAM(s) Oral every 8 hours PRN Hiccups  glucagon  Injectable 1 milliGRAM(s) IntraMuscular once PRN Glucose LESS THAN 70 milligrams/deciliter  HYDROmorphone  Injectable 1 milliGRAM(s) IV Push every 4 hours PRN Moderate Pain (4 - 6)  LORazepam   Injectable 2 milliGRAM(s) IV Push every 2 hours PRN CIWA>8      ALLERGIES:  Allergies    No Known Allergies    Intolerances        LABS:                        11.9   11.14 )-----------( 159      ( 16 Aug 2020 05:59 )             35.1     08-16    134<L>  |  95<L>  |  9   ----------------------------<  136<H>  4.2   |  29  |  0.83    Ca    8.8      16 Aug 2020 05:59  Phos  2.7     08-16  Mg     2.1     08-16    TPro  6.2  /  Alb  3.3  /  TBili  1.5<H>  /  DBili  x   /  AST  131<H>  /  ALT  55<H>  /  AlkPhos  72  08-16    PT/INR - ( 16 Aug 2020 05:59 )   PT: 14.3 sec;   INR: 1.20          PTT - ( 16 Aug 2020 05:59 )  PTT:29.6 sec    RADIOLOGY & ADDITIONAL TESTS: Reviewed. Transfer Note - [facility] to [facility]    Hospital Course:  Pt is a 31 yo M with PMH anxiety and alcohol abuse who p/f severe abdominal pain, distention, N/V x3d with inability to tolerate PO. On arrival, , bicarb 15, AG 35, lactate 16, WBC 19, lipase 645, glu 304, XR with c/f sigmoid volvulus. Surgery consulted and placed NGT with 150cc dark fluid return. GI consulted with recommendation of supportive therapy. Pt given chlorpromazine, zosyn, 5L NS and started on mIVF. Last drink evening of arrival, typically 4 shots of vodka a day and limearitas. CIWA protocol started but pt has not required ativan or librium. Lactate has downtrended and pt now tolerating clear liquids with advance to regular diet today. CT abd/pel c/w pancreatitis, hepatic steatosis, HSM, and mild ascites. Now pending RUQ US and TTE with c/f alcoholic cardiomegaly. Pt with persistent hiccups since admission, started on gabapentin PRN without relief and started on thorazine today. Pain has significantly improved, as has N/V, and pt is now stable for s/d [facility] for further observation and management.      SUBJECTIVE/OVERNIGHT EVENTS:    VITAL SIGNS:  Vital Signs Last 24 Hrs  T(C): 36.8 (16 Aug 2020 06:00), Max: 37.2 (15 Aug 2020 19:23)  T(F): 98.3 (16 Aug 2020 06:00), Max: 98.9 (15 Aug 2020 19:23)  HR: 108 (16 Aug 2020 08:25) (94 - 110)  BP: 146/101 (16 Aug 2020 08:25) (144/95 - 160/91)  BP(mean): 120 (16 Aug 2020 08:25) (115 - 120)  RR: 18 (16 Aug 2020 03:33) (18 - 18)  SpO2: 93% (16 Aug 2020 08:25) (92% - 98%)    PHYSICAL EXAM:  General: NAD; speaking in full sentences  HEENT: NC/AT; EOMI; MMM  Neck: supple; no JVD  Cardiac: RRR; +S1/S2  Pulm: CTA B/L; no W/R/R  GI: abdomen firm, distended, tympanitic, however non-tender, +BS, no rebound or guarding  Extremities: WWP; no edema, clubbing or cyanosis  Vasc: 2+ radial, DP pulses B/L  Neuro: AAOx3; no focal deficits    MEDICATIONS:  MEDICATIONS  (STANDING):  chlorproMAZINE    Tablet 25 milliGRAM(s) Oral every 8 hours  dextrose 5%. 1000 milliLiter(s) (50 mL/Hr) IV Continuous <Continuous>  dextrose 50% Injectable 12.5 Gram(s) IV Push once  dextrose 50% Injectable 25 Gram(s) IV Push once  dextrose 50% Injectable 25 Gram(s) IV Push once  enoxaparin Injectable 40 milliGRAM(s) SubCutaneous every 24 hours  folic acid Injectable 1 milliGRAM(s) IV Push every 24 hours  insulin lispro (HumaLOG) corrective regimen sliding scale   SubCutaneous Before meals and at bedtime  melatonin 5 milliGRAM(s) Oral at bedtime  thiamine IVPB 500 milliGRAM(s) IV Intermittent every 24 hours    MEDICATIONS  (PRN):  dextrose 40% Gel 15 Gram(s) Oral once PRN Blood Glucose LESS THAN 70 milliGRAM(s)/deciliter  gabapentin 100 milliGRAM(s) Oral every 8 hours PRN Hiccups  glucagon  Injectable 1 milliGRAM(s) IntraMuscular once PRN Glucose LESS THAN 70 milligrams/deciliter  HYDROmorphone  Injectable 1 milliGRAM(s) IV Push every 4 hours PRN Moderate Pain (4 - 6)  LORazepam   Injectable 2 milliGRAM(s) IV Push every 2 hours PRN CIWA>8      ALLERGIES:  Allergies    No Known Allergies    Intolerances        LABS:                        11.9   11.14 )-----------( 159      ( 16 Aug 2020 05:59 )             35.1     08-16    134<L>  |  95<L>  |  9   ----------------------------<  136<H>  4.2   |  29  |  0.83    Ca    8.8      16 Aug 2020 05:59  Phos  2.7     08-16  Mg     2.1     08-16    TPro  6.2  /  Alb  3.3  /  TBili  1.5<H>  /  DBili  x   /  AST  131<H>  /  ALT  55<H>  /  AlkPhos  72  08-16    PT/INR - ( 16 Aug 2020 05:59 )   PT: 14.3 sec;   INR: 1.20          PTT - ( 16 Aug 2020 05:59 )  PTT:29.6 sec    RADIOLOGY & ADDITIONAL TESTS: Reviewed. Transfer Note - [facility] to [facility]    Hospital Course:  Pt is a 31 yo M with PMH anxiety and alcohol abuse who p/f severe abdominal pain, distention, N/V x3d with inability to tolerate PO. On arrival, , bicarb 15, AG 35, lactate 16, WBC 19, lipase 645, glu 304, XR with c/f sigmoid volvulus. Surgery consulted and placed NGT with 150cc dark fluid return. GI consulted with recommendation of supportive therapy. Pt given chlorpromazine, zosyn, 5L NS and started on mIVF. Last drink evening of arrival, typically 4 shots of vodka a day and limearitas. CIWA protocol started but pt has not required ativan or librium. Lactate has downtrended and pt now tolerating clear liquids with advance to regular diet today. CT abd/pel c/w pancreatitis, hepatic steatosis, HSM, and mild ascites. Now pending RUQ US and TTE with c/f alcoholic cardiomegaly. Pt with persistent hiccups since admission, started on gabapentin PRN without relief and started on thorazine today. Pain has significantly improved, as has N/V, and pt is now stable for s/d [facility] for further observation and management.      SUBJECTIVE/OVERNIGHT EVENTS: Overnight pt given 1mg dilaudid for moderate abdominal pain and started on 100mg gabapentin TID for persistent hiccups. He reports have 1 BM, loose, brown, w/o blood or mucous. Otherwise, no acute overnight events. Pt seen in AM at bedside, resting comfortably in bed, and does not appear to be in any acute distress. He says his abdominal pain feels improved from yesterday, but is still experiencing discomfort from "feeling bloated". He denies fever, chills, headache, nausea, vomiting, sob, chest pain, constipation, genitourinary sx, extremity pain or swelling.    VITAL SIGNS:  Vital Signs Last 24 Hrs  T(C): 36.8 (16 Aug 2020 06:00), Max: 37.2 (15 Aug 2020 19:23)  T(F): 98.3 (16 Aug 2020 06:00), Max: 98.9 (15 Aug 2020 19:23)  HR: 108 (16 Aug 2020 08:25) (94 - 110)  BP: 146/101 (16 Aug 2020 08:25) (144/95 - 160/91)  BP(mean): 120 (16 Aug 2020 08:25) (115 - 120)  RR: 18 (16 Aug 2020 03:33) (18 - 18)  SpO2: 93% (16 Aug 2020 08:25) (92% - 98%)    PHYSICAL EXAM:  General: NAD; speaking in full sentences  HEENT: NC/AT; EOMI; MMM  Neck: supple; no JVD  Cardiac: RRR; +S1/S2  Pulm: CTA B/L; no W/R/R  GI: abdomen firm, distended, tympanitic, however non-tender, +BS, no rebound or guarding  Extremities: WWP; no edema, clubbing or cyanosis  Vasc: 2+ radial, DP pulses B/L  Neuro: AAOx3; no focal deficits    MEDICATIONS:  MEDICATIONS  (STANDING):  chlorproMAZINE    Tablet 25 milliGRAM(s) Oral every 8 hours  dextrose 5%. 1000 milliLiter(s) (50 mL/Hr) IV Continuous <Continuous>  dextrose 50% Injectable 12.5 Gram(s) IV Push once  dextrose 50% Injectable 25 Gram(s) IV Push once  dextrose 50% Injectable 25 Gram(s) IV Push once  enoxaparin Injectable 40 milliGRAM(s) SubCutaneous every 24 hours  folic acid Injectable 1 milliGRAM(s) IV Push every 24 hours  insulin lispro (HumaLOG) corrective regimen sliding scale   SubCutaneous Before meals and at bedtime  melatonin 5 milliGRAM(s) Oral at bedtime  thiamine IVPB 500 milliGRAM(s) IV Intermittent every 24 hours    MEDICATIONS  (PRN):  dextrose 40% Gel 15 Gram(s) Oral once PRN Blood Glucose LESS THAN 70 milliGRAM(s)/deciliter  gabapentin 100 milliGRAM(s) Oral every 8 hours PRN Hiccups  glucagon  Injectable 1 milliGRAM(s) IntraMuscular once PRN Glucose LESS THAN 70 milligrams/deciliter  HYDROmorphone  Injectable 1 milliGRAM(s) IV Push every 4 hours PRN Moderate Pain (4 - 6)  LORazepam   Injectable 2 milliGRAM(s) IV Push every 2 hours PRN CIWA>8      ALLERGIES:  Allergies    No Known Allergies    Intolerances        LABS:                        11.9   11.14 )-----------( 159      ( 16 Aug 2020 05:59 )             35.1     08-16    134<L>  |  95<L>  |  9   ----------------------------<  136<H>  4.2   |  29  |  0.83    Ca    8.8      16 Aug 2020 05:59  Phos  2.7     08-16  Mg     2.1     08-16    TPro  6.2  /  Alb  3.3  /  TBili  1.5<H>  /  DBili  x   /  AST  131<H>  /  ALT  55<H>  /  AlkPhos  72  08-16    PT/INR - ( 16 Aug 2020 05:59 )   PT: 14.3 sec;   INR: 1.20          PTT - ( 16 Aug 2020 05:59 )  PTT:29.6 sec    RADIOLOGY & ADDITIONAL TESTS: Reviewed.

## 2020-08-16 NOTE — PROGRESS NOTE ADULT - PROBLEM SELECTOR PLAN 6
ED consulted surgery for XR showing suspicion of sigmoid volvulus. Seen by surgery team in ED - no surgical management is warranted at this time. NGT was inserted w/ output of 150 dark fluid.  -NGT removed this AM   -Patient transitioned to clear liquid diet and tolerating well so far ED consulted surgery for XR showing suspicion of sigmoid volvulus. Seen by surgery team in ED - no surgical management is warranted at this time. NGT was inserted w/ output of 150 dark fluid. NGT removed this AM   - will appreciate surgery recs

## 2020-08-16 NOTE — PROGRESS NOTE ADULT - PROBLEM SELECTOR PLAN 7
Reports daily drinker (suspect minimizing extent of use). In ED, CIWA of  4 after ativan/morphine for hand tremor and n/v. Pancreatitis pain may confound CIWA scores.   - CIWA today 0  - High dose thiamine, folic acid, multivitamin replacement   - CIWA protocol x 1 more day  - ETOH cessation counseling  - Ativan 2mg IVP q4hr PRN CIWA>8 for now. Continue to monitor patient closely for signs of withdrawal. Low threshold to add Librium Reports daily drinker (suspect minimizing extent of use). In ED, CIWA of  4 after ativan/morphine for hand tremor and n/v. Pancreatitis pain may confound CIWA scores. Last drink afternoon of 8/14  - CIWA today 0  - High dose thiamine, folic acid, multivitamin replacement   - CIWA protocol x 1 more day  - ETOH cessation counseling  - Ativan 2mg IVP q4hr PRN CIWA>8 for now. Continue to monitor patient closely for signs of withdrawal. Low threshold to add Librium

## 2020-08-16 NOTE — PROGRESS NOTE ADULT - PROBLEM SELECTOR PLAN 3
Glu 300 on admission, downtrending with fluid resus. BHB 0.4. Trigs 133. No severe acidosis (likely compensated).  - Follow up A1C  - MISS q6hr

## 2020-08-16 NOTE — PROGRESS NOTE ADULT - ATTENDING COMMENTS
30M w/EtOH abuse and anxiety adm on 8/15 to the  service with acute interstitial pancreatitis 2/2 alcohol abuse. Also found to have AGMA 2/2 lactic acidosis from decreased PO intake. Initially had concern for sigmoid volvulus on Xray requiring NGT placement by surgery, but CT A/P showed no evidence of volvulus and NGT subsequently removed. GI consulted for pancreatitis and concern for alcoholic hepatitis, who recommended continued IVF resuscitation and monitoring of hepatic function. Patient now being stepped down to the [facility].     Seen and examined by me at bedside. States he feels better. Denies resting abdmoinal pain or anxiety. Feels some pain in abdomen when he begins hiccuping.    My exam:  GEN: Awake, comfortable. NAD.   HEENT: NCAT, PERRL, EOMI. Mucosa moist. No JVD.   RESP: CTA b/l  CV: RRR, normal s1/s2. No m/r/g.  ABD: Soft, NTND. BS+  EXT: Warm. trace pedal edema, clubbing, or cyanosis.   NEURO: AAOx3. No focal deficits.    A/P: 30M w/EtOH abuse and anxiety admitted to  on 8/15 with acute alcoholic pancreatitis and alcoholic hepatitis now being stepped down to the F.    #Pancreatitis   - c/w diet, encourage PO intake  - pt still tachycardic w/(+) lactate; will continue IVF for another day and reassess  - GI following, appreciate recs    #Alcohlic hepatitis  - GI following, appreciate recs  - monitor synthetic function    #Lactic acidosis - likely 2/2 decreased intake  - will trend to clearance    #Alcohol abuse - last drink 8/14 PM. No signs of withdrawal while in ED. Currently CIWA - 0.  - c/w thiamine, folic acid, multivitamin    #Hiccups  - c/w thorazine 25 q8h + PRN gabapentin 100 q8h 30M w/EtOH abuse and anxiety adm on 8/15 to the  service with acute interstitial pancreatitis 2/2 alcohol abuse. Also found to have AGMA 2/2 lactic acidosis from decreased PO intake. Initially had concern for sigmoid volvulus on Xray requiring NGT placement by surgery, but CT A/P showed no evidence of volvulus and NGT subsequently removed. GI consulted for pancreatitis and concern for alcoholic hepatitis, who recommended continued IVF resuscitation and monitoring of hepatic function. Patient now being stepped down to the [facility].     Seen and examined by me at bedside. States he feels better. Denies resting abdmoinal pain or anxiety. Feels some pain in abdomen when he begins hiccuping.    My exam:  GEN: Awake, comfortable. NAD.   HEENT: NCAT, PERRL, EOMI. Mucosa moist. No JVD.   RESP: CTA b/l  CV: RRR, normal s1/s2. No m/r/g.  ABD: Soft, NTND. BS+  EXT: Warm. trace pedal edema, clubbing, or cyanosis.   NEURO: AAOx3. No focal deficits.    A/P: 30M w/EtOH abuse and anxiety admitted to  on 8/15 with acute alcoholic pancreatitis and alcoholic hepatitis now being stepped down to the F.    #Pancreatitis   - c/w diet, encourage PO intake  - pt still tachycardic w/(+) lactate; will continue IVF for another day and reassess  - monitor UOP closely; should be urinating at least >0.5-1 cc/kg/hr  - GI following, appreciate recs    #Alcohlic hepatitis  - GI following, appreciate recs  - monitor synthetic function    #Lactic acidosis - likely 2/2 decreased intake  - will trend to clearance    #Alcohol abuse - last drink 8/14 PM. No signs of withdrawal while in ED. Currently CIWA - 0.  - c/w thiamine, folic acid, multivitamin    #Hiccups  - c/w thorazine 25 q8h + PRN gabapentin 100 q8h 30M w/EtOH abuse and anxiety adm on 8/15 to the  service with acute interstitial pancreatitis 2/2 alcohol abuse. Also found to have AGMA 2/2 lactic acidosis from decreased PO intake. Initially had concern for sigmoid volvulus on Xray requiring NGT placement by surgery, but CT A/P showed no evidence of volvulus and NGT subsequently removed. GI consulted for pancreatitis and concern for alcoholic hepatitis, who recommended continued IVF resuscitation and monitoring of hepatic function. Patient now being stepped down to the RM[facility].     Seen and examined by me at bedside. States he feels better. Denies resting abdominal pain or anxiety. Feels some pain in abdomen when he begins hiccuping.    My exam:  GEN: Awake, comfortable. NAD.   HEENT: NCAT, PERRL, EOMI. Mucosa moist. No JVD.   RESP: CTA b/l  CV: RRR, normal s1/s2. No m/r/g.  ABD: Soft, NTND. BS+  EXT: Warm. trace pedal edema, clubbing, or cyanosis.   NEURO: AAOx3. No focal deficits.    A/P: 30M w/EtOH abuse and anxiety admitted to  on 8/15 with acute alcoholic pancreatitis and alcoholic hepatitis now being stepped down to the F.    #Pancreatitis   - c/w diet, encourage PO intake  - pt still tachycardic w/(+) lactate; will continue IVF for another day and reassess  - monitor UOP closely; should be urinating at least >0.5-1 cc/kg/hr  - GI following, appreciate recs    #Alcohlic hepatitis  - GI following, appreciate recs  - monitor synthetic function    #Lactic acidosis - likely 2/2 decreased intake  - will trend to clearance    #Alcohol abuse - last drink 8/14 PM. No signs of withdrawal while in ED. Currently CIWA - 0.  - c/w thiamine, folic acid, multivitamin    #Hiccups  - c/w thorazine 25 q8h + PRN gabapentin 100 q8h

## 2020-08-16 NOTE — PROGRESS NOTE ADULT - SUBJECTIVE AND OBJECTIVE BOX
POST-OP DAY: 31yo M with PMH ETOH use (6 beers daily) who presented for 1st episode of acute pancreatitis     SUBJECTIVE: Patient seen and examined bedside by resident. Overall he is feeling better today and he endorses minimal abdominal pain, improved from prior days worsened by eating and laying on the sides. Denies N/V. Tolerating ensures, water, and apple sauce. BMs brown, formed, soft. Endorses flatus and urinating well. Denies symptoms of withdrawal or previous withdrawal from ETOH including chills, diaphoresis, seizures, and hallucinations. Drinks about 6 beers daily and endorses wanting to quit because his first child (boy) is due next week and he does not want to end up in the hospital again.    enoxaparin Injectable 40 milliGRAM(s) SubCutaneous every 24 hours    MEDICATIONS  (PRN):  dextrose 40% Gel 15 Gram(s) Oral once PRN Blood Glucose LESS THAN 70 milliGRAM(s)/deciliter  gabapentin 100 milliGRAM(s) Oral every 8 hours PRN Hiccups  glucagon  Injectable 1 milliGRAM(s) IntraMuscular once PRN Glucose LESS THAN 70 milligrams/deciliter  HYDROmorphone  Injectable 0.5 milliGRAM(s) IV Push every 6 hours PRN Moderate Pain (4 - 6)  HYDROmorphone  Injectable 1 milliGRAM(s) IV Push every 6 hours PRN Severe Pain (7 - 10)  LORazepam   Injectable 2 milliGRAM(s) IV Push every 2 hours PRN CIWA>8      I&O's Detail    15 Aug 2020 07:01  -  16 Aug 2020 07:00  --------------------------------------------------------  IN:    lactated ringers.: 1250 mL  Total IN: 1250 mL    OUT:    Voided: 700 mL  Total OUT: 700 mL    Total NET: 550 mL    Vital Signs Last 24 Hrs  T(C): 37.4 (16 Aug 2020 10:00), Max: 37.4 (16 Aug 2020 10:00)  T(F): 99.3 (16 Aug 2020 10:00), Max: 99.3 (16 Aug 2020 10:00)  HR: 117 (16 Aug 2020 12:30) (94 - 117)  BP: 161/99 (16 Aug 2020 12:30) (144/95 - 161/99)  BP(mean): 120 (16 Aug 2020 08:25) (115 - 120)  RR: 18 (16 Aug 2020 12:30) (18 - 18)  SpO2: 96% (16 Aug 2020 12:30) (92% - 98%)    General: NAD, resting comfortably in bed, laying on back  C/V: pulses present and strong in b/l upper extremitie   Pulm: Nonlabored breathing, no respiratory distress  Abd: tense, distended, tympanic to percussion, mild pain to palpation of the RLQ and epigastric region, no rebound or guarding   Extrem: WWP, no edema    LABS:                        11.9   11.14 )-----------( 159      ( 16 Aug 2020 05:59 )             35.1     08-16    134<L>  |  95<L>  |  9   ----------------------------<  136<H>  4.2   |  29  |  0.83    Ca    8.8      16 Aug 2020 05:59  Phos  2.7     08-16  Mg     2.1     08-16    TPro  6.2  /  Alb  3.3  /  TBili  1.5<H>  /  DBili  x   /  AST  131<H>  /  ALT  55<H>  /  AlkPhos  72  08-16    PT/INR - ( 16 Aug 2020 05:59 )   PT: 14.3 sec;   INR: 1.20          PTT - ( 16 Aug 2020 05:59 )  PTT:29.6 sec  Urinalysis Basic - ( 14 Aug 2020 23:53 )    Color: Yellow / Appearance: Clear / SG: >=1.030 / pH: x  Gluc: x / Ketone: 15 mg/dL  / Bili: Small / Urobili: 0.2 E.U./dL   Blood: x / Protein: 100 mg/dL / Nitrite: NEGATIVE   Leuk Esterase: NEGATIVE / RBC: < 5 /HPF / WBC < 5 /HPF   Sq Epi: x / Non Sq Epi: 0-5 /HPF / Bacteria: Present /HPF        RADIOLOGY & ADDITIONAL STUDIES:  CT Abdomen and Pelvis No Cont:   EXAM:  CT ABDOMEN AND PELVIS                        PROCEDURE DATE:  08/14/2020    Gastrointestinal tract: Evaluation of the GI tract without the use of oral contrast shows an enteric tube with tip in the gastric antrum. No bowel obstruction. Gas-filled ascending and transverse colon. Wall thickening of the descending colon is likely reactive due to adjacent inflammatory changes from the acute pancreatitis. Possible mild wall thickening of the second andthird duodenal segments could represent reactive duodenitis. Appendix is normal.    Impression:  Interstitial edematous pancreatitis. No drainable/organized collection on this noncontrast examination. Additional findings as above.

## 2020-08-16 NOTE — PROGRESS NOTE ADULT - PROBLEM SELECTOR PLAN 3
Glu 300 on admission, downtrending with fluid resus. BHB 0.4. Trigs 133. No severe acidosis (likely compensated).  -Follow up [facility]  -MISS q6hr Glu 300 on admission, downtrending with fluid resus. BHB 0.4. Trigs 133. No severe acidosis (likely compensated). HbA1c 5.6, well-controlled.  - c/w MISS q6hr for glycemic control

## 2020-08-16 NOTE — PROGRESS NOTE ADULT - SUBJECTIVE AND OBJECTIVE BOX
Pt seen and examined at bedside.  No acute event overnight and no new complaint.  Reports continue abdominal fullness.  Hiccuping with increase PO intake.  Denies fever, chill, chest pain, shortness of breath, nausea, vomiting, hematemesis. Had 4 loose stool today, denies melena or hematochezia.    Allergies    No Known Allergies    Intolerances      MEDICATIONS:  MEDICATIONS  (STANDING):  chlorproMAZINE    Tablet 25 milliGRAM(s) Oral every 8 hours  dextrose 5%. 1000 milliLiter(s) (50 mL/Hr) IV Continuous <Continuous>  dextrose 50% Injectable 12.5 Gram(s) IV Push once  dextrose 50% Injectable 25 Gram(s) IV Push once  dextrose 50% Injectable 25 Gram(s) IV Push once  enoxaparin Injectable 40 milliGRAM(s) SubCutaneous every 24 hours  folic acid Injectable 1 milliGRAM(s) IV Push every 24 hours  insulin lispro (HumaLOG) corrective regimen sliding scale   SubCutaneous Before meals and at bedtime  melatonin 5 milliGRAM(s) Oral at bedtime  multivitamin 1 Tablet(s) Oral daily  sodium chloride 0.9%. 1000 milliLiter(s) (150 mL/Hr) IV Continuous <Continuous>  thiamine IVPB 500 milliGRAM(s) IV Intermittent every 24 hours    MEDICATIONS  (PRN):  dextrose 40% Gel 15 Gram(s) Oral once PRN Blood Glucose LESS THAN 70 milliGRAM(s)/deciliter  gabapentin 100 milliGRAM(s) Oral every 8 hours PRN Hiccups  glucagon  Injectable 1 milliGRAM(s) IntraMuscular once PRN Glucose LESS THAN 70 milligrams/deciliter  HYDROmorphone  Injectable 0.5 milliGRAM(s) IV Push every 6 hours PRN Moderate Pain (4 - 6)  HYDROmorphone  Injectable 1 milliGRAM(s) IV Push every 6 hours PRN Severe Pain (7 - 10)  LORazepam   Injectable 2 milliGRAM(s) IV Push every 2 hours PRN CIWA>8    Vital Signs Last 24 Hrs  T(C): 37.3 (16 Aug 2020 14:00), Max: 37.4 (16 Aug 2020 10:00)  T(F): 99.2 (16 Aug 2020 14:00), Max: 99.3 (16 Aug 2020 10:00)  HR: 117 (16 Aug 2020 12:30) (94 - 117)  BP: 161/99 (16 Aug 2020 12:30) (144/95 - 161/99)  BP(mean): 120 (16 Aug 2020 08:25) (115 - 120)  RR: 18 (16 Aug 2020 12:30) (18 - 18)  SpO2: 96% (16 Aug 2020 12:30) (92% - 98%)    08-15 @ 07:01  -  08-16 @ 07:00  --------------------------------------------------------  IN: 1250 mL / OUT: 700 mL / NET: 550 mL    08-16 @ 07:01  -  08-16 @ 16:04  --------------------------------------------------------  IN: 600 mL / OUT: 0 mL / NET: 600 mL      PHYSICAL EXAM:    General: Well developed; well nourished; in no acute distress  HEENT: MMM, conjunctiva and sclera clear  Gastrointestinal: Soft, distended; tender to palpation diffusely, but most prominently in the of the LLQ, No rebound or guarding  Skin: Warm and dry. No obvious rash    LABS:                        11.9   11.14 )-----------( 159      ( 16 Aug 2020 05:59 )             35.1     08-16    134<L>  |  95<L>  |  9   ----------------------------<  136<H>  4.2   |  29  |  0.83    Ca    8.8      16 Aug 2020 05:59  Phos  2.7     08-16  Mg     2.1     08-16    TPro  6.2  /  Alb  3.3  /  TBili  1.5<H>  /  DBili  x   /  AST  131<H>  /  ALT  55<H>  /  AlkPhos  72  08-16    PT/INR - ( 16 Aug 2020 05:59 )   PT: 14.3 sec;   INR: 1.20          PTT - ( 16 Aug 2020 05:59 )  PTT:29.6 sec      Urinalysis Basic - ( 14 Aug 2020 23:53 )    Color: Yellow / Appearance: Clear / SG: >=1.030 / pH: x  Gluc: x / Ketone: 15 mg/dL  / Bili: Small / Urobili: 0.2 E.U./dL   Blood: x / Protein: 100 mg/dL / Nitrite: NEGATIVE   Leuk Esterase: NEGATIVE / RBC: < 5 /HPF / WBC < 5 /HPF   Sq Epi: x / Non Sq Epi: 0-5 /HPF / Bacteria: Present /HPF                Culture - Blood (collected 15 Aug 2020 00:04)  Source: .Blood Blood  Preliminary Report (16 Aug 2020 01:00):    No growth at 1 day.    Culture - Blood (collected 15 Aug 2020 00:04)  Source: .Blood Blood  Preliminary Report (16 Aug 2020 01:00):    No growth at 1 day.      RADIOLOGY & ADDITIONAL STUDIES: reviewed

## 2020-08-16 NOTE — PROGRESS NOTE ADULT - PROBLEM SELECTOR PLAN 1
Patient with moderate-severe acute interstitial pancreatitis 2/2 EtOH use (trigs neg, no stone) given severe volume depletion, vitals (HR 150s SBP 90s, after fluid resus), Hct 52%, possible BISI. Lactate 16. S/p 5L NS in ED. Tele admit to monitor for severe sequelae: ARDS, DKA.   -Hold fluids for now as patient is tolerating clear fluids. If unable to tolerate, will restart fluids   -Treat hyperglycemia MISS q6hr   -Trend BMP, strict I/O   -Dilaudid 1mg IVP q4hr for pain   -NG tube removed --> patient advanced to clear liquid diet and tolerating well.   -Follow up GI recs: c/w aggressive IVF, early refeeding, pain control, folic acid, multivitamin, thiamine    #Elevated lactate:   -Lactate 16 --> 13.6 --> 11 --> 7.6 --> 8.7. Likely in the setting of starvation ketosis 2/2 decreased PO intake and vomiting . Encourage PO   -F/u 4 PM lactate. Restart fluids if still elevated  -F/u RUQ U/S today Patient with moderate-severe acute interstitial pancreatitis 2/2 EtOH use (trigs neg, no stone) given severe volume depletion, vitals (HR 150s SBP 90s, after fluid resus), Hct 52%, possible BISI. Lactate 16. S/p 5L NS in ED. Tele admit to monitor for severe sequelae: ARDS, DKA.   - c/w 1L NS, infuse 150cc/hr  - c/w strict Is & Os  - c/w PRN Dilaudid 0.5mg IVP q4hr for moderate pain   - c/w PRN Dilaudid 1mg IVP q4hr for severe pain   - will appreciate GI recs: c/w aggressive IVF, early refeeding, pain control, folic acid, multivitamin, thiamine    #Elevated lactate:   -Lactate 16 --> 13.6 --> 11 --> 7.6 --> 8.7. Likely in the setting of starvation ketosis 2/2 decreased PO intake and vomiting. Lactate today improved down to 4.1 (8/16) - Encourage PO, advance as tolerated  - F/u RUQ U/S

## 2020-08-16 NOTE — PROGRESS NOTE ADULT - PROBLEM SELECTOR PLAN 2
Sinus tac in setting of severe dehydration/pancreatitis. CT with enlarged heart concern for EtOH Cardiomyopathy   -f/u TTE  -Monitor on tele Sinus tac in setting of severe dehydration/pancreatitis. CT with enlarged heart concern for EtOH Cardiomyopathy   - f/u TTE

## 2020-08-16 NOTE — PROGRESS NOTE ADULT - PROBLEM SELECTOR PLAN 4
Mixed AGMA and metabolic alkalosis. Suspect lactic acidosis 2/2 starvation ketosis 2/2 decreased PO intake and vomiting.   -Patient started on clear liquids this AM  -Trend lactate to clear Mixed AGMA and metabolic alkalosis. Suspect lactic acidosis 2/2 starvation ketosis 2/2 decreased PO intake and vomiting.   - encourage PO, advance diet as tolerated  - will trend lactate to clear

## 2020-08-16 NOTE — PROGRESS NOTE ADULT - PROBLEM SELECTOR PLAN 9
F: none   E: replete K, Mg, Ca, Phos to goal   N: clear liquids   DVT: SCD, Lovenox   Code: Full F: none   E: replete K, Mg, Ca, Phos to goal   N: regular diet  DVT: SCD, Lovenox   Code: Full

## 2020-08-16 NOTE — PROGRESS NOTE ADULT - ASSESSMENT
30M pmh of anxiety, EtOH abuse presents with abd pain x3d, severe vomiting found to be have acute interstitial pancreatitis and multiple electrolyte derangements 2/2 dehydration and vomiting.   ED consulted surgery because of suspicion of sigmoid volvulus. CT A/P demonstrated no evidence of volvulus. Currently with improvement of symptoms.     Plan:  - No surgical intervention  - Continue  with early enteral nutrition  - Strongly recommend IVF resuscitation and ensure adequate hydration 2/2 low PO intake  - Continue care per primary team. Please contact surgery for any change in abdominal exam or clinical status.     Plan discussed with chief resident.   ____________________________________________________  Swetha Moraes MD     PGY1 - Surgery Team 1

## 2020-08-16 NOTE — PROGRESS NOTE ADULT - ASSESSMENT
30M w/ PMHx of EtOH use disorder and anxiety p/w 3 days of abdominal pain and vomiting found to have acute interstitial pancreatitis and alcoholic hepatitis    #Acute Interstitial Pancreatitis  Likely 2/2 alcohol use.  Patient with BISAP score of 1.    -Restart aggressive IVF, 5-10 cc/kg/kg  -Early refeeding   -Pain control per primary team  -Close monitoring of tachycardia and hgb  -Afebrile without abx, continue to monitor leukocytosis and fever curve    #Alcoholic Hepatitis, DF 15 on presentation, 12 8/16  Patient with elevated LT likely from alcohol hepatitis.  Mild elevation with low DF, no role for prednisolone at this time.  Would continue to monitor symptoms and management of withdrawal.  CT finding of hepatomegaly and steatosis consistent with alcoholic hepatitis.  -Encourage alcohol cessation  -Management of withdrawal per primary team  -Continue supplement with folic, mvi, and thiamine    Recommendation d/w primary team  Case d/w svc attg

## 2020-08-16 NOTE — PROGRESS NOTE ADULT - PROBLEM SELECTOR PLAN 7
Reports daily drinker (suspect minimizing extent of use). In ED, CIWA of  4 after ativan/morphine for hand tremor and n/v. Pancreatitis pain may confound CIWA scores.   -CIWA today 2 (mild  tremor and anxiety)   -High dose thiamine, folic acid, multivitamin replacement   -CIWA protocol   -ETOH cessation counseling  -Offer Psych consult for anxiety   -Ativan 2mg IVP q4hr for now. Continue to monitor patient closely for signs of withdrawal. Low threshold to add Librium Reports daily drinker (suspect minimizing extent of use). In ED, CIWA of  4 after ativan/morphine for hand tremor and n/v. Pancreatitis pain may confound CIWA scores. CIWA today 2 (mild  tremor and anxiety)   - c/w high dose thiamine, folic acid, multivitamin replacement   - c/w CIWA protocol   - c/w ETOH cessation counseling  - c/w Ativan 2mg IVP q4hr for now.   - continue to monitor patient closely for signs of withdrawal. Low threshold to add Librium

## 2020-08-16 NOTE — PROGRESS NOTE ADULT - PROBLEM SELECTOR PLAN 1
Patient with moderate-severe acute interstitial pancreatitis 2/2 EtOH use (trigs neg, no stone) given severe volume depletion, vitals (HR 150s SBP 90s, after fluid resus), Hct 52%, possible BISI. Lactate 16. S/p 5L NS in ED.   - Lactate trending down with fluids, now 4.1. Repeat with PM labs.   - Hold fluids for now as patient is tolerating clear fluids. If unable to tolerate, will restart fluids. Pending CXR for new onset cough and lower extremity edema. Monitor for ARDS vs fluid overload.   - Treat hyperglycemia MISS q6hr, currently controlled   - Trend BMP, strict I/O   - Dilaudid 1mg IVP q4hr for pain, well controlled  - NG tube removed --> patient advanced to clear liquid diet and tolerating well.   - Follow up GI recs: c/w aggressive IVF, early refeeding, pain control, folic acid, multivitamin, thiamine    #Elevated lactate:   -Lactate 16 --> 13.6 --> 11 --> 7.6 --> 8.7 -> 4.1. Likely in the setting of starvation ketosis 2/2 decreased PO intake and vomiting . Encourage PO   - F/u 8 PM lactate and CXR to determine whether or not to resume fluids. Encouraged PO intake.   - F/u RUQ U/S

## 2020-08-16 NOTE — PROGRESS NOTE ADULT - PROBLEM SELECTOR PLAN 2
Sinus tac in setting of severe dehydration/pancreatitis. CT with enlarged heart concern for EtOH Cardiomyopathy   - f/u TTE  - HR~110, s/d to regional

## 2020-08-16 NOTE — PROGRESS NOTE ADULT - PROBLEM SELECTOR PLAN 5
WBC 19 likely reactive/concentrated, s/p zosyn in ED. WBC down to 11 this AM.   -Will hold additional abx for now  -Continue to trend

## 2020-08-16 NOTE — PROGRESS NOTE ADULT - PROBLEM SELECTOR PLAN 5
WBC 19 likely reactive/concentrated, s/p zosyn in ED. WBC down to 15.76 this AM.   -Will hold additional abx for now  -Continue to trend WBC 19 likely reactive/concentrated, s/p zosyn in ED. WBC down to 15.76 this AM.   - no indication for abx at this time  - will continue to trend WBC on routine CBC

## 2020-08-16 NOTE — PROGRESS NOTE ADULT - ASSESSMENT
29 y/o M w/ PMH of anxiety, EtOH abuse presents with abd pain x3d and severe vomiting, found to be have acute interstitial pancreatitis and multiple electrolyte derangements 2/2 dehydration and vomiting.

## 2020-08-17 LAB
ALBUMIN SERPL ELPH-MCNC: 3 G/DL — LOW (ref 3.3–5)
ALP SERPL-CCNC: 71 U/L — SIGNIFICANT CHANGE UP (ref 40–120)
ALT FLD-CCNC: 53 U/L — HIGH (ref 10–45)
ANION GAP SERPL CALC-SCNC: 11 MMOL/L — SIGNIFICANT CHANGE UP (ref 5–17)
AST SERPL-CCNC: 110 U/L — HIGH (ref 10–40)
BASOPHILS # BLD AUTO: 0.03 K/UL — SIGNIFICANT CHANGE UP (ref 0–0.2)
BASOPHILS NFR BLD AUTO: 0.4 % — SIGNIFICANT CHANGE UP (ref 0–2)
BILIRUB SERPL-MCNC: 0.8 MG/DL — SIGNIFICANT CHANGE UP (ref 0.2–1.2)
BUN SERPL-MCNC: 4 MG/DL — LOW (ref 7–23)
CALCIUM SERPL-MCNC: 8.3 MG/DL — LOW (ref 8.4–10.5)
CHLORIDE SERPL-SCNC: 103 MMOL/L — SIGNIFICANT CHANGE UP (ref 96–108)
CO2 SERPL-SCNC: 26 MMOL/L — SIGNIFICANT CHANGE UP (ref 22–31)
CREAT SERPL-MCNC: 0.75 MG/DL — SIGNIFICANT CHANGE UP (ref 0.5–1.3)
EOSINOPHIL # BLD AUTO: 0.02 K/UL — SIGNIFICANT CHANGE UP (ref 0–0.5)
EOSINOPHIL NFR BLD AUTO: 0.2 % — SIGNIFICANT CHANGE UP (ref 0–6)
GLUCOSE BLDC GLUCOMTR-MCNC: 125 MG/DL — HIGH (ref 70–99)
GLUCOSE BLDC GLUCOMTR-MCNC: 127 MG/DL — HIGH (ref 70–99)
GLUCOSE BLDC GLUCOMTR-MCNC: 129 MG/DL — HIGH (ref 70–99)
GLUCOSE BLDC GLUCOMTR-MCNC: 150 MG/DL — HIGH (ref 70–99)
GLUCOSE SERPL-MCNC: 151 MG/DL — HIGH (ref 70–99)
HCT VFR BLD CALC: 32.4 % — LOW (ref 39–50)
HGB BLD-MCNC: 10.9 G/DL — LOW (ref 13–17)
IMM GRANULOCYTES NFR BLD AUTO: 0.6 % — SIGNIFICANT CHANGE UP (ref 0–1.5)
LYMPHOCYTES # BLD AUTO: 1.33 K/UL — SIGNIFICANT CHANGE UP (ref 1–3.3)
LYMPHOCYTES # BLD AUTO: 16.1 % — SIGNIFICANT CHANGE UP (ref 13–44)
MAGNESIUM SERPL-MCNC: 2.1 MG/DL — SIGNIFICANT CHANGE UP (ref 1.6–2.6)
MCHC RBC-ENTMCNC: 32.1 PG — SIGNIFICANT CHANGE UP (ref 27–34)
MCHC RBC-ENTMCNC: 33.6 GM/DL — SIGNIFICANT CHANGE UP (ref 32–36)
MCV RBC AUTO: 95.3 FL — SIGNIFICANT CHANGE UP (ref 80–100)
MONOCYTES # BLD AUTO: 0.88 K/UL — SIGNIFICANT CHANGE UP (ref 0–0.9)
MONOCYTES NFR BLD AUTO: 10.6 % — SIGNIFICANT CHANGE UP (ref 2–14)
NEUTROPHILS # BLD AUTO: 5.96 K/UL — SIGNIFICANT CHANGE UP (ref 1.8–7.4)
NEUTROPHILS NFR BLD AUTO: 72.1 % — SIGNIFICANT CHANGE UP (ref 43–77)
NRBC # BLD: 0 /100 WBCS — SIGNIFICANT CHANGE UP (ref 0–0)
PLATELET # BLD AUTO: 145 K/UL — LOW (ref 150–400)
POTASSIUM SERPL-MCNC: 3.5 MMOL/L — SIGNIFICANT CHANGE UP (ref 3.5–5.3)
POTASSIUM SERPL-SCNC: 3.5 MMOL/L — SIGNIFICANT CHANGE UP (ref 3.5–5.3)
PROT SERPL-MCNC: 6.1 G/DL — SIGNIFICANT CHANGE UP (ref 6–8.3)
RBC # BLD: 3.4 M/UL — LOW (ref 4.2–5.8)
RBC # FLD: 15.2 % — HIGH (ref 10.3–14.5)
SODIUM SERPL-SCNC: 140 MMOL/L — SIGNIFICANT CHANGE UP (ref 135–145)
WBC # BLD: 8.27 K/UL — SIGNIFICANT CHANGE UP (ref 3.8–10.5)
WBC # FLD AUTO: 8.27 K/UL — SIGNIFICANT CHANGE UP (ref 3.8–10.5)

## 2020-08-17 PROCEDURE — 99233 SBSQ HOSP IP/OBS HIGH 50: CPT | Mod: GC

## 2020-08-17 PROCEDURE — 93306 TTE W/DOPPLER COMPLETE: CPT | Mod: 26

## 2020-08-17 RX ORDER — HYDROMORPHONE HYDROCHLORIDE 2 MG/ML
0.5 INJECTION INTRAMUSCULAR; INTRAVENOUS; SUBCUTANEOUS
Refills: 0 | Status: DISCONTINUED | OUTPATIENT
Start: 2020-08-17 | End: 2020-08-18

## 2020-08-17 RX ORDER — POTASSIUM PHOSPHATE, MONOBASIC POTASSIUM PHOSPHATE, DIBASIC 236; 224 MG/ML; MG/ML
30 INJECTION, SOLUTION INTRAVENOUS ONCE
Refills: 0 | Status: COMPLETED | OUTPATIENT
Start: 2020-08-17 | End: 2020-08-17

## 2020-08-17 RX ORDER — HYDROMORPHONE HYDROCHLORIDE 2 MG/ML
1 INJECTION INTRAMUSCULAR; INTRAVENOUS; SUBCUTANEOUS EVERY 6 HOURS
Refills: 0 | Status: DISCONTINUED | OUTPATIENT
Start: 2020-08-17 | End: 2020-08-18

## 2020-08-17 RX ORDER — LIDOCAINE 4 G/100G
1 CREAM TOPICAL EVERY 24 HOURS
Refills: 0 | Status: DISCONTINUED | OUTPATIENT
Start: 2020-08-17 | End: 2020-08-18

## 2020-08-17 RX ORDER — ESCITALOPRAM OXALATE 10 MG/1
0 TABLET, FILM COATED ORAL
Qty: 0 | Refills: 0 | DISCHARGE

## 2020-08-17 RX ADMIN — HYDROMORPHONE HYDROCHLORIDE 0.5 MILLIGRAM(S): 2 INJECTION INTRAMUSCULAR; INTRAVENOUS; SUBCUTANEOUS at 19:11

## 2020-08-17 RX ADMIN — POTASSIUM PHOSPHATE, MONOBASIC POTASSIUM PHOSPHATE, DIBASIC 85 MILLIMOLE(S): 236; 224 INJECTION, SOLUTION INTRAVENOUS at 08:57

## 2020-08-17 RX ADMIN — HYDROMORPHONE HYDROCHLORIDE 1 MILLIGRAM(S): 2 INJECTION INTRAMUSCULAR; INTRAVENOUS; SUBCUTANEOUS at 02:40

## 2020-08-17 RX ADMIN — Medication 25 MILLIGRAM(S): at 16:40

## 2020-08-17 RX ADMIN — HYDROMORPHONE HYDROCHLORIDE 1 MILLIGRAM(S): 2 INJECTION INTRAMUSCULAR; INTRAVENOUS; SUBCUTANEOUS at 10:31

## 2020-08-17 RX ADMIN — HYDROMORPHONE HYDROCHLORIDE 1 MILLIGRAM(S): 2 INJECTION INTRAMUSCULAR; INTRAVENOUS; SUBCUTANEOUS at 06:45

## 2020-08-17 RX ADMIN — LIDOCAINE 1 PATCH: 4 CREAM TOPICAL at 19:21

## 2020-08-17 RX ADMIN — HYDROMORPHONE HYDROCHLORIDE 1 MILLIGRAM(S): 2 INJECTION INTRAMUSCULAR; INTRAVENOUS; SUBCUTANEOUS at 16:35

## 2020-08-17 RX ADMIN — HYDROMORPHONE HYDROCHLORIDE 1 MILLIGRAM(S): 2 INJECTION INTRAMUSCULAR; INTRAVENOUS; SUBCUTANEOUS at 09:54

## 2020-08-17 RX ADMIN — Medication 1 MILLIGRAM(S): at 03:50

## 2020-08-17 RX ADMIN — Medication 25 MILLIGRAM(S): at 09:14

## 2020-08-17 RX ADMIN — HYDROMORPHONE HYDROCHLORIDE 1 MILLIGRAM(S): 2 INJECTION INTRAMUSCULAR; INTRAVENOUS; SUBCUTANEOUS at 22:41

## 2020-08-17 RX ADMIN — HYDROMORPHONE HYDROCHLORIDE 1 MILLIGRAM(S): 2 INJECTION INTRAMUSCULAR; INTRAVENOUS; SUBCUTANEOUS at 06:29

## 2020-08-17 RX ADMIN — SODIUM CHLORIDE 200 MILLILITER(S): 9 INJECTION INTRAMUSCULAR; INTRAVENOUS; SUBCUTANEOUS at 00:20

## 2020-08-17 RX ADMIN — HYDROMORPHONE HYDROCHLORIDE 1 MILLIGRAM(S): 2 INJECTION INTRAMUSCULAR; INTRAVENOUS; SUBCUTANEOUS at 02:23

## 2020-08-17 RX ADMIN — ENOXAPARIN SODIUM 40 MILLIGRAM(S): 100 INJECTION SUBCUTANEOUS at 06:34

## 2020-08-17 RX ADMIN — Medication 105 MILLIGRAM(S): at 02:23

## 2020-08-17 RX ADMIN — HYDROMORPHONE HYDROCHLORIDE 1 MILLIGRAM(S): 2 INJECTION INTRAMUSCULAR; INTRAVENOUS; SUBCUTANEOUS at 17:20

## 2020-08-17 RX ADMIN — Medication 5 MILLIGRAM(S): at 22:41

## 2020-08-17 RX ADMIN — HYDROMORPHONE HYDROCHLORIDE 0.5 MILLIGRAM(S): 2 INJECTION INTRAMUSCULAR; INTRAVENOUS; SUBCUTANEOUS at 13:20

## 2020-08-17 RX ADMIN — Medication 20 MILLIEQUIVALENT(S): at 00:19

## 2020-08-17 RX ADMIN — Medication 25 MILLIGRAM(S): at 01:40

## 2020-08-17 RX ADMIN — HYDROMORPHONE HYDROCHLORIDE 1 MILLIGRAM(S): 2 INJECTION INTRAMUSCULAR; INTRAVENOUS; SUBCUTANEOUS at 23:14

## 2020-08-17 RX ADMIN — Medication 1 TABLET(S): at 12:32

## 2020-08-17 RX ADMIN — LIDOCAINE 1 PATCH: 4 CREAM TOPICAL at 16:40

## 2020-08-17 NOTE — PROGRESS NOTE ADULT - PROBLEM SELECTOR PLAN 1
Patient with moderate-severe acute interstitial pancreatitis 2/2 EtOH use (trigs neg, no stone) given severe volume depletion, vitals (HR 150s SBP 90s, after fluid resus), Hct 52%, possible BISI. Lactate 16. S/p 5L NS in ED. RUQ showing Enlarged and hypoechoic pancreatic body with peripancreatic fluid, consistent with acute pancreatitis.   - Lactate trending down with fluids, now wnl.   - Aggressive fluid resuscitation per GI and surgery recs   - Monitor for ARDS vs fluid overload.   - Treat hyperglycemia MISS q6hr, currently controlled   - Trend BMP, strict I/O, patient net negative today  - Dilaudid .5mg IVP q2 PRN for mod pain, 1mg IVP q6hr for severe pain  - Lidocaine patch for back pain  - NG tube removed --> patient advanced to regular diet and tolerating well.   - Follow up GI recs: c/w aggressive IVF, early refeeding, pain control, folic acid, multivitamin, thiamine  -thorazine and gabapentin for hiccups  #Elevated lactate:   -Lactate 16 --> 13.6 --> 11 --> 7.6 --> 8.7 -> 4.1 -> 1.7 Likely in the setting of starvation ketosis 2/2 decreased PO intake and vomiting . Encourage PO   - F/u 8 PM lactate and CXR to determine whether or not to resume fluids. Encouraged PO intake.

## 2020-08-17 NOTE — PROGRESS NOTE ADULT - PROBLEM SELECTOR PLAN 8
INR 1.5, mild transaminitis likely 2/2 alcohol hepatitis   -Trend PT/INR   -Transaminitis improving today. Continue to monitor   -ETOH cessation counseling   -F/u GI recs: no role for prednisolone at this time. Will continue to monitor closely for signs of withdrawal.
INR 1.5, mild transaminitis likely 2/2 alcohol hepatitis   -Trend PT/INR   -Transaminitis worsened slightly today. Continue to monitor   -ETOH cessation counseling   -F/u GI recs: no role for prednisolone at this time. Will continue to monitor closely for signs of withdrawal.
INR 1.5, mild transaminitis likely 2/2 alcohol hepatitis   -Trend PT/INR   -Transaminitis improving today. Continue to monitor   -ETOH cessation counseling   -F/u GI recs: no role for prednisolone at this time. Will continue to monitor closely for signs of withdrawal.
INR 1.5, mild transaminitis likely 2/2 alcohol hepatitis   -Trend PT/INR   -Transaminitis worsened slightly today. Continue to monitor   -ETOH cessation counseling   -F/u GI recs: no role for prednisolone at this time. Will continue to monitor closely for signs of withdrawal.

## 2020-08-17 NOTE — PROGRESS NOTE ADULT - PROBLEM SELECTOR PROBLEM 8
Alcoholic steatohepatitis

## 2020-08-17 NOTE — PROGRESS NOTE ADULT - SUBJECTIVE AND OBJECTIVE BOX
*** in progress *** OVERNIGHT EVENTS: NAEO    SUBJECTIVE / INTERVAL HPI: Patient seen and examined at bedside. Patient states 7/10 achy LLQ pain w/ radiation to his L flank. Reports having a soft bowel movement during the last 24hrs. Denies nausea/vomiting at bedside. Patient denies fevers, chills, N/S, HA, change in vision/hearing, cp, SOB, cough, diarrhea, constipation, hematochezia/melena, dysuria, hematuria, new onset weakness/numbness      ROS: negative unless otherwise stated above.    VITAL SIGNS:  Vital Signs Last 24 Hrs  T(C): 37.1 (17 Aug 2020 04:29), Max: 37.2 (16 Aug 2020 16:54)  T(F): 98.7 (17 Aug 2020 04:29), Max: 98.9 (16 Aug 2020 16:54)  HR: 109 (17 Aug 2020 04:29) (108 - 115)  BP: 137/81 (17 Aug 2020 04:29) (137/81 - 152/103)  BP(mean): --  RR: 17 (17 Aug 2020 06:43) (16 - 20)  SpO2: 96% (17 Aug 2020 06:43) (91% - 96%)    PHYSICAL EXAM:    General: WDWN  HEENT: NC/AT; PERRL, anicteric sclera; MMM  Neck: supple  Cardiovascular: NRRR; +S1/S2, no r/m/g  Respiratory: CTA B/L; no W/R/R; no retractions or accessory muscle use  Gastrointestinal: soft, NT/ND; +BSx4  Extremities: WWP; no edema, clubbing or cyanosis  Vascular: 2+ radial, DP/PT pulses B/L  Neurological: AAOx3; no focal deficits    MEDICATIONS:  MEDICATIONS  (STANDING):  chlorproMAZINE    Tablet 25 milliGRAM(s) Oral every 8 hours  dextrose 5%. 1000 milliLiter(s) (50 mL/Hr) IV Continuous <Continuous>  dextrose 50% Injectable 12.5 Gram(s) IV Push once  dextrose 50% Injectable 25 Gram(s) IV Push once  dextrose 50% Injectable 25 Gram(s) IV Push once  enoxaparin Injectable 40 milliGRAM(s) SubCutaneous every 24 hours  folic acid Injectable 1 milliGRAM(s) IV Push every 24 hours  insulin lispro (HumaLOG) corrective regimen sliding scale   SubCutaneous Before meals and at bedtime  melatonin 5 milliGRAM(s) Oral at bedtime  multivitamin 1 Tablet(s) Oral daily  sodium chloride 0.9%. 1000 milliLiter(s) (200 mL/Hr) IV Continuous <Continuous>    MEDICATIONS  (PRN):  dextrose 40% Gel 15 Gram(s) Oral once PRN Blood Glucose LESS THAN 70 milliGRAM(s)/deciliter  gabapentin 100 milliGRAM(s) Oral every 8 hours PRN Hiccups  glucagon  Injectable 1 milliGRAM(s) IntraMuscular once PRN Glucose LESS THAN 70 milligrams/deciliter  HYDROmorphone  Injectable 0.5 milliGRAM(s) IV Push every 2 hours PRN Moderate Pain (4 - 6)  HYDROmorphone  Injectable 1 milliGRAM(s) IV Push every 6 hours PRN Severe Pain (7 - 10)  lidocaine   Patch 1 Patch Transdermal every 24 hours PRN back pain  LORazepam   Injectable 2 milliGRAM(s) IV Push every 2 hours PRN CIWA>8      ALLERGIES:  Allergies    No Known Allergies    Intolerances        LABS:                        10.9   8.27  )-----------( 145      ( 17 Aug 2020 08:51 )             32.4     08-17    140  |  103  |  4<L>  ----------------------------<  151<H>  3.5   |  26  |  0.75    Ca    8.3<L>      17 Aug 2020 08:51  Phos  1.9     08-16  Mg     2.1     08-17    TPro  6.1  /  Alb  3.0<L>  /  TBili  0.8  /  DBili  x   /  AST  110<H>  /  ALT  53<H>  /  AlkPhos  71  08-17    PT/INR - ( 16 Aug 2020 05:59 )   PT: 14.3 sec;   INR: 1.20          PTT - ( 16 Aug 2020 05:59 )  PTT:29.6 sec    CAPILLARY BLOOD GLUCOSE      POCT Blood Glucose.: 127 mg/dL (17 Aug 2020 12:06)      RADIOLOGY & ADDITIONAL TESTS: Reviewed.s *** OVERNIGHT EVENTS: NAEO    SUBJECTIVE / INTERVAL HPI: Patient seen and examined at bedside. Patient states 7/10 achy LLQ pain w/ radiation to his L flank. Reports having a soft bowel movement during the last 24hrs. Denies nausea/vomiting at bedside. Patient denies fevers, chills, N/S, HA, change in vision/hearing, cp, SOB, cough, diarrhea, constipation, hematochezia/melena, dysuria, hematuria, new onset weakness/numbness      ROS: negative unless otherwise stated above.    VITAL SIGNS:  Vital Signs Last 24 Hrs  T(C): 37.1 (17 Aug 2020 04:29), Max: 37.2 (16 Aug 2020 16:54)  T(F): 98.7 (17 Aug 2020 04:29), Max: 98.9 (16 Aug 2020 16:54)  HR: 109 (17 Aug 2020 04:29) (108 - 115)  BP: 137/81 (17 Aug 2020 04:29) (137/81 - 152/103)  BP(mean): --  RR: 17 (17 Aug 2020 06:43) (16 - 20)  SpO2: 96% (17 Aug 2020 06:43) (91% - 96%)    PHYSICAL EXAM:    General: WDWN, lying comfortably in bed, NAD  HEENT: NC/AT; PERRL, anicteric sclera; MMM  Cardiovascular: tachycardic but RR; +S1/S2, no r/m/g  Respiratory: CTA B/L; no W/R/R; no retractions or accessory muscle use; intermittent hiccups  Gastrointestinal: soft, ND; +BSx4, pain w/ palpation, worst at LLQ. achy at rest, sharp w/ pressure, no rebound/guarding. no hepatosplenomegaly. pain w/ palpation of L flank  Extremities: WWP; no edema, clubbing or cyanosis  Vascular: 2+ radial, DP/PT pulses B/L  Neurological: AAOx3; no focal deficits    MEDICATIONS:  MEDICATIONS  (STANDING):  chlorproMAZINE    Tablet 25 milliGRAM(s) Oral every 8 hours  dextrose 5%. 1000 milliLiter(s) (50 mL/Hr) IV Continuous <Continuous>  dextrose 50% Injectable 12.5 Gram(s) IV Push once  dextrose 50% Injectable 25 Gram(s) IV Push once  dextrose 50% Injectable 25 Gram(s) IV Push once  enoxaparin Injectable 40 milliGRAM(s) SubCutaneous every 24 hours  folic acid Injectable 1 milliGRAM(s) IV Push every 24 hours  insulin lispro (HumaLOG) corrective regimen sliding scale   SubCutaneous Before meals and at bedtime  melatonin 5 milliGRAM(s) Oral at bedtime  multivitamin 1 Tablet(s) Oral daily  sodium chloride 0.9%. 1000 milliLiter(s) (200 mL/Hr) IV Continuous <Continuous>    MEDICATIONS  (PRN):  dextrose 40% Gel 15 Gram(s) Oral once PRN Blood Glucose LESS THAN 70 milliGRAM(s)/deciliter  gabapentin 100 milliGRAM(s) Oral every 8 hours PRN Hiccups  glucagon  Injectable 1 milliGRAM(s) IntraMuscular once PRN Glucose LESS THAN 70 milligrams/deciliter  HYDROmorphone  Injectable 0.5 milliGRAM(s) IV Push every 2 hours PRN Moderate Pain (4 - 6)  HYDROmorphone  Injectable 1 milliGRAM(s) IV Push every 6 hours PRN Severe Pain (7 - 10)  lidocaine   Patch 1 Patch Transdermal every 24 hours PRN back pain  LORazepam   Injectable 2 milliGRAM(s) IV Push every 2 hours PRN CIWA>8      ALLERGIES:  Allergies    No Known Allergies    Intolerances        LABS:                        10.9   8.27  )-----------( 145      ( 17 Aug 2020 08:51 )             32.4     08-17    140  |  103  |  4<L>  ----------------------------<  151<H>  3.5   |  26  |  0.75    Ca    8.3<L>      17 Aug 2020 08:51  Phos  1.9     08-16  Mg     2.1     08-17    TPro  6.1  /  Alb  3.0<L>  /  TBili  0.8  /  DBili  x   /  AST  110<H>  /  ALT  53<H>  /  AlkPhos  71  08-17    PT/INR - ( 16 Aug 2020 05:59 )   PT: 14.3 sec;   INR: 1.20          PTT - ( 16 Aug 2020 05:59 )  PTT:29.6 sec    CAPILLARY BLOOD GLUCOSE      POCT Blood Glucose.: 127 mg/dL (17 Aug 2020 12:06)      RADIOLOGY & ADDITIONAL TESTS: Reviewed.s ***

## 2020-08-17 NOTE — PROGRESS NOTE ADULT - ATTENDING COMMENTS
patient endorsing 4/10 pain, but says it is much improved overall  no more nausea or vomiting, tolerating diet   lactate now normalized s/p aggressive IVF resuscitation  net negative  slightly tachycardic likely 2/2 pain     CIWA score 0     will f/u with TTE regarding cardiomegaly noted on imaging   question of alcoholic dilated cardiomyopathy    patient has been counseled on alcohol abuse  he endorses interest in outpatient rehabilitation centers as well as medications to help with alcohol cessation  have discussed that medications should likely be started outpatient after resolution of current acute pancreatitis     transaminitis improving   will add lidocaine patch to back pain reproducible upon palpation

## 2020-08-17 NOTE — PROGRESS NOTE ADULT - PROBLEM SELECTOR PLAN 6
ED consulted surgery for XR showing suspicion of sigmoid volvulus. Seen by surgery team in ED - no surgical management is warranted at this time. NGT was inserted w/ output of 150 dark fluid.  - NGT removed  - Patient tolerating regular diet and having BM  - Consider portable AXR if worsening.

## 2020-08-17 NOTE — PROGRESS NOTE ADULT - PROBLEM SELECTOR PLAN 3
Glu 300 on admission, downtrending with fluid resus. BHB 0.4. Trigs 133. No severe acidosis (likely compensated). HA1c 5.6 on 8/14. glucose improving  - MISS q6hr

## 2020-08-17 NOTE — PROGRESS NOTE ADULT - PROBLEM SELECTOR PLAN 7
Reports daily drinker (suspect minimizing extent of use). In ED, CIWA of  4 after ativan/morphine for hand tremor and n/v. Pancreatitis pain may confound CIWA scores. Last drink afternoon of 8/14  - CIWA today 0  - High dose thiamine, folic acid, multivitamin replacement   - CIWA protocol x 1 more day  - ETOH cessation counseling  - Ativan 2mg IVP q4hr PRN CIWA>8 for now. Continue to monitor patient closely for signs of withdrawal. Low threshold to add Librium

## 2020-08-17 NOTE — PROGRESS NOTE ADULT - PROVIDER SPECIALTY LIST ADULT
Gastroenterology
Gastroenterology
Internal Medicine
Surgery

## 2020-08-17 NOTE — PROGRESS NOTE ADULT - PROBLEM SELECTOR PLAN 4
Mixed AGMA and metabolic alkalosis. Suspect lactic acidosis 2/2 starvation ketosis 2/2 decreased PO intake and vomiting. Lactate normalized w/ fluids.  - Diet advanced

## 2020-08-17 NOTE — PROGRESS NOTE ADULT - SUBJECTIVE AND OBJECTIVE BOX
GASTROENTEROLOGY PROGRESS NOTE  Patient seen and examined at bedside. Reports progressive improvement in abdominal pain, denied N/V, tolerating diet well.    PERTINENT REVIEW OF SYSTEMS:  As noted above    Allergies    No Known Allergies    Intolerances      MEDICATIONS:  MEDICATIONS  (STANDING):  chlorproMAZINE    Tablet 25 milliGRAM(s) Oral every 8 hours  dextrose 5%. 1000 milliLiter(s) (50 mL/Hr) IV Continuous <Continuous>  dextrose 50% Injectable 12.5 Gram(s) IV Push once  dextrose 50% Injectable 25 Gram(s) IV Push once  dextrose 50% Injectable 25 Gram(s) IV Push once  enoxaparin Injectable 40 milliGRAM(s) SubCutaneous every 24 hours  folic acid Injectable 1 milliGRAM(s) IV Push every 24 hours  insulin lispro (HumaLOG) corrective regimen sliding scale   SubCutaneous Before meals and at bedtime  melatonin 5 milliGRAM(s) Oral at bedtime  multivitamin 1 Tablet(s) Oral daily  sodium chloride 0.9%. 1000 milliLiter(s) (200 mL/Hr) IV Continuous <Continuous>    MEDICATIONS  (PRN):  dextrose 40% Gel 15 Gram(s) Oral once PRN Blood Glucose LESS THAN 70 milliGRAM(s)/deciliter  gabapentin 100 milliGRAM(s) Oral every 8 hours PRN Hiccups  glucagon  Injectable 1 milliGRAM(s) IntraMuscular once PRN Glucose LESS THAN 70 milligrams/deciliter  HYDROmorphone  Injectable 0.5 milliGRAM(s) IV Push every 2 hours PRN Moderate Pain (4 - 6)  HYDROmorphone  Injectable 1 milliGRAM(s) IV Push every 6 hours PRN Severe Pain (7 - 10)  lidocaine   Patch 1 Patch Transdermal every 24 hours PRN back pain  LORazepam   Injectable 2 milliGRAM(s) IV Push every 2 hours PRN CIWA>8    Vital Signs Last 24 Hrs  T(C): 36.4 (17 Aug 2020 21:07), Max: 37.2 (17 Aug 2020 02:08)  T(F): 97.5 (17 Aug 2020 21:07), Max: 98.9 (17 Aug 2020 02:08)  HR: 103 (17 Aug 2020 21:07) (94 - 109)  BP: 159/111 (17 Aug 2020 21:07) (137/81 - 160/107)  BP(mean): --  RR: 20 (17 Aug 2020 21:07) (16 - 181)  SpO2: 96% (17 Aug 2020 21:07) (91% - 98%)    08-16 @ 07:01  -  08-17 @ 07:00  --------------------------------------------------------  IN: 3200 mL / OUT: 2700 mL / NET: 500 mL    08-17 @ 07:01  -  08-17 @ 22:24  --------------------------------------------------------  IN: 1400 mL / OUT: 2960 mL / NET: -1560 mL      PHYSICAL EXAM:    General: Well developed; well nourished; in no acute distress  HEENT: MMM, conjunctiva and sclera clear  Gastrointestinal: Soft mild LUQ tenderness, non-distended; Normal bowel sounds; No rebound or guarding  Skin: Warm and dry. No obvious rash    LABS:                        10.9   8.27  )-----------( 145      ( 17 Aug 2020 08:51 )             32.4     08-17    140  |  103  |  4<L>  ----------------------------<  151<H>  3.5   |  26  |  0.75    Ca    8.3<L>      17 Aug 2020 08:51  Phos  1.9     08-16  Mg     2.1     08-17    TPro  6.1  /  Alb  3.0<L>  /  TBili  0.8  /  DBili  x   /  AST  110<H>  /  ALT  53<H>  /  AlkPhos  71  08-17    PT/INR - ( 16 Aug 2020 05:59 )   PT: 14.3 sec;   INR: 1.20          PTT - ( 16 Aug 2020 05:59 )  PTT:29.6 sec                  Culture - Blood (collected 15 Aug 2020 00:04)  Source: .Blood Blood  Preliminary Report (17 Aug 2020 01:01):    No growth at 2 days.    Culture - Blood (collected 15 Aug 2020 00:04)  Source: .Blood Blood  Preliminary Report (17 Aug 2020 01:01):    No growth at 2 days.      RADIOLOGY & ADDITIONAL STUDIES:  Reviewed

## 2020-08-17 NOTE — PROGRESS NOTE ADULT - ASSESSMENT
30M w/ PMHx of EtOH use disorder and anxiety p/w 3 days of abdominal pain and vomiting found to have acute interstitial pancreatitis and alcoholic hepatitis    #Acute Interstitial Pancreatitis  Likely 2/2 alcohol use.  Patient with BISAP score of 1 on admission.    Now improved symptoms and lab parameters and tolerating regular diet  -IV hydration and pain control per primary team    #Mild alcoholic Hepatitis, DF 15 on admission improving  -Encourage alcohol cessation and SW eval  -Management of withdrawal per primary team  -Continue supplement with folic, mvi, and thiamine    will sign off. Call back PRN.  Case d/w svc attg

## 2020-08-17 NOTE — PROGRESS NOTE ADULT - ASSESSMENT
30M pmh of anxiety, EtOH abuse presents with abd pain x3d, severe vomiting found to be have acute interstitial pancreatitis and multiple electrolyte derangements 2/2 dehydration and vomiting. Surgery consulted surgery due to suspicion of sigmoid volvulus on AXR. CT A/P demonstrated no evidence of volvulus. Currently managed for Acute Pancreatitis. Clinically stable.     No acute surgical intervention at this time  Continue Enteral nutrition  Continue IVF hydration  Rest of care per primary team  Surgery Team 4c will sign off, please reach out with additional questions or concerns  Plan discussed with attending

## 2020-08-17 NOTE — PROGRESS NOTE ADULT - SUBJECTIVE AND OBJECTIVE BOX
SUBJECTIVE: Examined at the bedside resting comfortably. Endorses hiccups and lower abdomina pain radiating to the back. Tolerating diet, denies nausea and emesis. Passing flatus w/ loose non bloody BMs.     enoxaparin Injectable 40 milliGRAM(s) SubCutaneous every 24 hours      Vital Signs Last 24 Hrs  T(C): 37.1 (17 Aug 2020 04:29), Max: 37.3 (16 Aug 2020 14:00)  T(F): 98.7 (17 Aug 2020 04:29), Max: 99.2 (16 Aug 2020 14:00)  HR: 109 (17 Aug 2020 04:29) (108 - 115)  BP: 137/81 (17 Aug 2020 04:29) (137/81 - 152/103)  BP(mean): --  RR: 17 (17 Aug 2020 06:43) (16 - 20)  SpO2: 96% (17 Aug 2020 06:43) (91% - 96%)  I&O's Detail    16 Aug 2020 07:01  -  17 Aug 2020 07:00  --------------------------------------------------------  IN:    sodium chloride 0.9%: 600 mL    sodium chloride 0.9%.: 2600 mL  Total IN: 3200 mL    OUT:    Voided: 2700 mL  Total OUT: 2700 mL    Total NET: 500 mL      17 Aug 2020 07:01  -  17 Aug 2020 12:43  --------------------------------------------------------  IN:    sodium chloride 0.9%.: 200 mL  Total IN: 200 mL    OUT:  Total OUT: 0 mL    Total NET: 200 mL          Physical Exam  General: NAD, resting comfortably in bed  C/V: NSR  Pulm: Nonlabored breathing, no respiratory distress  Abd: soft, midly distended, mild epigastric tenderness, no rebound or guarding.  Extrem: WWP, no edema,  Neuro: A/O x 3, CNs II-XII grossly intact, no focal deficits, normal sensation    LABS:                        10.9   8.27  )-----------( 145      ( 17 Aug 2020 08:51 )             32.4     08-17    140  |  103  |  4<L>  ----------------------------<  151<H>  3.5   |  26  |  0.75    Ca    8.3<L>      17 Aug 2020 08:51  Phos  1.9     08-16  Mg     2.1     08-17    TPro  6.1  /  Alb  3.0<L>  /  TBili  0.8  /  DBili  x   /  AST  110<H>  /  ALT  53<H>  /  AlkPhos  71  08-17    PT/INR - ( 16 Aug 2020 05:59 )   PT: 14.3 sec;   INR: 1.20          PTT - ( 16 Aug 2020 05:59 )  PTT:29.6 sec      RADIOLOGY & ADDITIONAL STUDIES:  < from: CT Abdomen and Pelvis No Cont (08.14.20 @ 22:35) >  Findings:    Evaluation of the abdominopelvic viscera is limited due to noncontrast technique and streak artifact from overlying arms.    Lower chest: No consolidation or significant pleural effusion.    Liver:  Diffuse hepatic steatosis. Mild hepatomegaly. No definite focal lesion.    Gallbladder: No calcified gallstones. Gallbladder sludge. No biliary dilatation.    Spleen:  Normal.    Pancreas:  Pancreas is diffusely enlarged with significant amount of peripancreatic fat strandingand fluid tracking down both lateral aspects of the anterior pararenal space. No drainable/organized collection on this noncontrast examination. No pancreatic duct dilatation.    Adrenal glands:  Normal.    Kidneys: Normal.    Adenopathy:  No lymphadenopathy in abdomen or pelvis.    Ascites: Mild ascites along the right paracolic gutter and within the rectovesical pouch.    Gastrointestinal tract: Evaluation of the GI tract without the use of oral contrast shows an enteric tube with tip in the gastric antrum. No bowel obstruction. Gas-filled ascending and transverse colon. Wall thickening of the descending colon is likely reactive due to adjacent inflammatory changes from the acute pancreatitis. Possible mild wall thickening of the second andthird duodenal segments could represent reactive duodenitis. Appendix is normal.    Vessels: Normal.    Pelvic organs: Distended bladder. Normal-sized prostate.    Soft tissues: Normal.    Bones: Normal.    Impression:  Interstitial edematous pancreatitis. No drainable/organized collection on this noncontrast examination. Additional findings as above.            < end of copied text >

## 2020-08-17 NOTE — PROGRESS NOTE ADULT - PROBLEM SELECTOR PLAN 2
Sinus tac in setting of severe dehydration/pancreatitis. CT with enlarged heart concern for EtOH Cardiomyopathy   - TTE w/o Contrast w/ Doppler    1. Patient tachycardic during the study.   2. There is mild tricuspid regurgitation. Pulmonary artery systolic pressure (estimated using the tricuspid regurgitant gradient and an estimate of right atrial pressure) is 48 mmHg.   3. The right ventricle is borderline dilated. Right ventricular systolic function is normal.   4. The left ventricle is normal in size, wall thickness, and systolic function with a calculated ejection fraction of 55%.   5. Trivial pericardial effusion.   6. Left pleural effusion.  - HR~110, s/d to regional

## 2020-08-18 ENCOUNTER — TRANSCRIPTION ENCOUNTER (OUTPATIENT)
Age: 30
End: 2020-08-18

## 2020-08-18 VITALS
SYSTOLIC BLOOD PRESSURE: 161 MMHG | HEART RATE: 92 BPM | DIASTOLIC BLOOD PRESSURE: 95 MMHG | OXYGEN SATURATION: 94 % | TEMPERATURE: 99 F | RESPIRATION RATE: 17 BRPM

## 2020-08-18 DIAGNOSIS — E87.2 ACIDOSIS: ICD-10-CM

## 2020-08-18 LAB
ALBUMIN SERPL ELPH-MCNC: 3 G/DL — LOW (ref 3.3–5)
ALP SERPL-CCNC: 93 U/L — SIGNIFICANT CHANGE UP (ref 40–120)
ALT FLD-CCNC: 62 U/L — HIGH (ref 10–45)
ANION GAP SERPL CALC-SCNC: 12 MMOL/L — SIGNIFICANT CHANGE UP (ref 5–17)
APTT BLD: 31.1 SEC — SIGNIFICANT CHANGE UP (ref 27.5–35.5)
AST SERPL-CCNC: 116 U/L — HIGH (ref 10–40)
BASOPHILS # BLD AUTO: 0.03 K/UL — SIGNIFICANT CHANGE UP (ref 0–0.2)
BASOPHILS NFR BLD AUTO: 0.3 % — SIGNIFICANT CHANGE UP (ref 0–2)
BILIRUB SERPL-MCNC: 0.8 MG/DL — SIGNIFICANT CHANGE UP (ref 0.2–1.2)
BUN SERPL-MCNC: 3 MG/DL — LOW (ref 7–23)
CALCIUM SERPL-MCNC: 8.7 MG/DL — SIGNIFICANT CHANGE UP (ref 8.4–10.5)
CHLORIDE SERPL-SCNC: 101 MMOL/L — SIGNIFICANT CHANGE UP (ref 96–108)
CO2 SERPL-SCNC: 26 MMOL/L — SIGNIFICANT CHANGE UP (ref 22–31)
CREAT SERPL-MCNC: 0.7 MG/DL — SIGNIFICANT CHANGE UP (ref 0.5–1.3)
EOSINOPHIL # BLD AUTO: 0.1 K/UL — SIGNIFICANT CHANGE UP (ref 0–0.5)
EOSINOPHIL NFR BLD AUTO: 1.1 % — SIGNIFICANT CHANGE UP (ref 0–6)
GLUCOSE BLDC GLUCOMTR-MCNC: 112 MG/DL — HIGH (ref 70–99)
GLUCOSE BLDC GLUCOMTR-MCNC: 122 MG/DL — HIGH (ref 70–99)
GLUCOSE SERPL-MCNC: 118 MG/DL — HIGH (ref 70–99)
HCT VFR BLD CALC: 32.1 % — LOW (ref 39–50)
HGB BLD-MCNC: 10.9 G/DL — LOW (ref 13–17)
IMM GRANULOCYTES NFR BLD AUTO: 0.6 % — SIGNIFICANT CHANGE UP (ref 0–1.5)
INR BLD: 1.11 — SIGNIFICANT CHANGE UP (ref 0.88–1.16)
LYMPHOCYTES # BLD AUTO: 1.12 K/UL — SIGNIFICANT CHANGE UP (ref 1–3.3)
LYMPHOCYTES # BLD AUTO: 11.9 % — LOW (ref 13–44)
MAGNESIUM SERPL-MCNC: 1.9 MG/DL — SIGNIFICANT CHANGE UP (ref 1.6–2.6)
MCHC RBC-ENTMCNC: 32.2 PG — SIGNIFICANT CHANGE UP (ref 27–34)
MCHC RBC-ENTMCNC: 34 GM/DL — SIGNIFICANT CHANGE UP (ref 32–36)
MCV RBC AUTO: 94.7 FL — SIGNIFICANT CHANGE UP (ref 80–100)
MONOCYTES # BLD AUTO: 1.13 K/UL — HIGH (ref 0–0.9)
MONOCYTES NFR BLD AUTO: 12 % — SIGNIFICANT CHANGE UP (ref 2–14)
NEUTROPHILS # BLD AUTO: 6.99 K/UL — SIGNIFICANT CHANGE UP (ref 1.8–7.4)
NEUTROPHILS NFR BLD AUTO: 74.1 % — SIGNIFICANT CHANGE UP (ref 43–77)
NRBC # BLD: 0 /100 WBCS — SIGNIFICANT CHANGE UP (ref 0–0)
PHOSPHATE SERPL-MCNC: 3.7 MG/DL — SIGNIFICANT CHANGE UP (ref 2.5–4.5)
PLATELET # BLD AUTO: 157 K/UL — SIGNIFICANT CHANGE UP (ref 150–400)
POTASSIUM SERPL-MCNC: 3.5 MMOL/L — SIGNIFICANT CHANGE UP (ref 3.5–5.3)
POTASSIUM SERPL-SCNC: 3.5 MMOL/L — SIGNIFICANT CHANGE UP (ref 3.5–5.3)
PROT SERPL-MCNC: 6.2 G/DL — SIGNIFICANT CHANGE UP (ref 6–8.3)
PROTHROM AB SERPL-ACNC: 13.3 SEC — SIGNIFICANT CHANGE UP (ref 10.6–13.6)
RBC # BLD: 3.39 M/UL — LOW (ref 4.2–5.8)
RBC # FLD: 15 % — HIGH (ref 10.3–14.5)
SODIUM SERPL-SCNC: 139 MMOL/L — SIGNIFICANT CHANGE UP (ref 135–145)
WBC # BLD: 9.43 K/UL — SIGNIFICANT CHANGE UP (ref 3.8–10.5)
WBC # FLD AUTO: 9.43 K/UL — SIGNIFICANT CHANGE UP (ref 3.8–10.5)

## 2020-08-18 PROCEDURE — 82962 GLUCOSE BLOOD TEST: CPT

## 2020-08-18 PROCEDURE — 36415 COLL VENOUS BLD VENIPUNCTURE: CPT

## 2020-08-18 PROCEDURE — 86901 BLOOD TYPING SEROLOGIC RH(D): CPT

## 2020-08-18 PROCEDURE — 84100 ASSAY OF PHOSPHORUS: CPT

## 2020-08-18 PROCEDURE — 82010 KETONE BODYS QUAN: CPT

## 2020-08-18 PROCEDURE — 96368 THER/DIAG CONCURRENT INF: CPT

## 2020-08-18 PROCEDURE — 99239 HOSP IP/OBS DSCHRG MGMT >30: CPT

## 2020-08-18 PROCEDURE — 82330 ASSAY OF CALCIUM: CPT

## 2020-08-18 PROCEDURE — 71046 X-RAY EXAM CHEST 2 VIEWS: CPT

## 2020-08-18 PROCEDURE — 83735 ASSAY OF MAGNESIUM: CPT

## 2020-08-18 PROCEDURE — 99285 EMERGENCY DEPT VISIT HI MDM: CPT | Mod: 25

## 2020-08-18 PROCEDURE — 83605 ASSAY OF LACTIC ACID: CPT

## 2020-08-18 PROCEDURE — 93306 TTE W/DOPPLER COMPLETE: CPT

## 2020-08-18 PROCEDURE — 96365 THER/PROPH/DIAG IV INF INIT: CPT

## 2020-08-18 PROCEDURE — 85025 COMPLETE CBC W/AUTO DIFF WBC: CPT

## 2020-08-18 PROCEDURE — 80053 COMPREHEN METABOLIC PANEL: CPT

## 2020-08-18 PROCEDURE — 84443 ASSAY THYROID STIM HORMONE: CPT

## 2020-08-18 PROCEDURE — 82803 BLOOD GASES ANY COMBINATION: CPT

## 2020-08-18 PROCEDURE — 96375 TX/PRO/DX INJ NEW DRUG ADDON: CPT

## 2020-08-18 PROCEDURE — 83880 ASSAY OF NATRIURETIC PEPTIDE: CPT

## 2020-08-18 PROCEDURE — 85610 PROTHROMBIN TIME: CPT

## 2020-08-18 PROCEDURE — 76705 ECHO EXAM OF ABDOMEN: CPT

## 2020-08-18 PROCEDURE — 74019 RADEX ABDOMEN 2 VIEWS: CPT

## 2020-08-18 PROCEDURE — 87389 HIV-1 AG W/HIV-1&-2 AB AG IA: CPT

## 2020-08-18 PROCEDURE — 87635 SARS-COV-2 COVID-19 AMP PRB: CPT

## 2020-08-18 PROCEDURE — 83036 HEMOGLOBIN GLYCOSYLATED A1C: CPT

## 2020-08-18 PROCEDURE — 87040 BLOOD CULTURE FOR BACTERIA: CPT

## 2020-08-18 PROCEDURE — 80074 ACUTE HEPATITIS PANEL: CPT

## 2020-08-18 PROCEDURE — 80048 BASIC METABOLIC PNL TOTAL CA: CPT

## 2020-08-18 PROCEDURE — 93005 ELECTROCARDIOGRAM TRACING: CPT

## 2020-08-18 PROCEDURE — 96366 THER/PROPH/DIAG IV INF ADDON: CPT

## 2020-08-18 PROCEDURE — 71045 X-RAY EXAM CHEST 1 VIEW: CPT

## 2020-08-18 PROCEDURE — 84295 ASSAY OF SERUM SODIUM: CPT

## 2020-08-18 PROCEDURE — 84132 ASSAY OF SERUM POTASSIUM: CPT

## 2020-08-18 PROCEDURE — 86850 RBC ANTIBODY SCREEN: CPT

## 2020-08-18 PROCEDURE — 84478 ASSAY OF TRIGLYCERIDES: CPT

## 2020-08-18 PROCEDURE — 81001 URINALYSIS AUTO W/SCOPE: CPT

## 2020-08-18 PROCEDURE — 74176 CT ABD & PELVIS W/O CONTRAST: CPT

## 2020-08-18 PROCEDURE — 83690 ASSAY OF LIPASE: CPT

## 2020-08-18 PROCEDURE — 82550 ASSAY OF CK (CPK): CPT

## 2020-08-18 PROCEDURE — 85730 THROMBOPLASTIN TIME PARTIAL: CPT

## 2020-08-18 PROCEDURE — 85027 COMPLETE CBC AUTOMATED: CPT

## 2020-08-18 RX ORDER — MAGNESIUM SULFATE 500 MG/ML
2 VIAL (ML) INJECTION
Refills: 0 | Status: COMPLETED | OUTPATIENT
Start: 2020-08-18 | End: 2020-08-18

## 2020-08-18 RX ORDER — POTASSIUM CHLORIDE 20 MEQ
40 PACKET (EA) ORAL ONCE
Refills: 0 | Status: COMPLETED | OUTPATIENT
Start: 2020-08-18 | End: 2020-08-18

## 2020-08-18 RX ORDER — CHLORPROMAZINE HCL 10 MG
1 TABLET ORAL
Qty: 15 | Refills: 0
Start: 2020-08-18 | End: 2020-08-22

## 2020-08-18 RX ORDER — FOLIC ACID 0.8 MG
1 TABLET ORAL
Qty: 30 | Refills: 0
Start: 2020-08-18 | End: 2020-09-16

## 2020-08-18 RX ORDER — LIDOCAINE 4 G/100G
1 CREAM TOPICAL
Qty: 7 | Refills: 0
Start: 2020-08-18 | End: 2020-08-24

## 2020-08-18 RX ORDER — ACETAMINOPHEN 500 MG
650 TABLET ORAL EVERY 6 HOURS
Refills: 0 | Status: DISCONTINUED | OUTPATIENT
Start: 2020-08-18 | End: 2020-08-18

## 2020-08-18 RX ORDER — ACETAMINOPHEN 500 MG
2 TABLET ORAL
Qty: 32 | Refills: 0
Start: 2020-08-18 | End: 2020-08-21

## 2020-08-18 RX ORDER — THIAMINE MONONITRATE (VIT B1) 100 MG
1 TABLET ORAL
Qty: 30 | Refills: 0
Start: 2020-08-18 | End: 2020-09-16

## 2020-08-18 RX ORDER — LISINOPRIL 2.5 MG/1
5 TABLET ORAL DAILY
Refills: 0 | Status: DISCONTINUED | OUTPATIENT
Start: 2020-08-18 | End: 2020-08-18

## 2020-08-18 RX ADMIN — Medication 102.5 MILLIGRAM(S): at 06:09

## 2020-08-18 RX ADMIN — Medication 1 MILLIGRAM(S): at 04:20

## 2020-08-18 RX ADMIN — Medication 25 MILLIGRAM(S): at 09:43

## 2020-08-18 RX ADMIN — Medication 50 GRAM(S): at 08:00

## 2020-08-18 RX ADMIN — Medication 50 GRAM(S): at 09:43

## 2020-08-18 RX ADMIN — Medication 1 TABLET(S): at 13:35

## 2020-08-18 RX ADMIN — HYDROMORPHONE HYDROCHLORIDE 1 MILLIGRAM(S): 2 INJECTION INTRAMUSCULAR; INTRAVENOUS; SUBCUTANEOUS at 05:14

## 2020-08-18 RX ADMIN — ENOXAPARIN SODIUM 40 MILLIGRAM(S): 100 INJECTION SUBCUTANEOUS at 06:09

## 2020-08-18 RX ADMIN — Medication 650 MILLIGRAM(S): at 13:35

## 2020-08-18 RX ADMIN — HYDROMORPHONE HYDROCHLORIDE 1 MILLIGRAM(S): 2 INJECTION INTRAMUSCULAR; INTRAVENOUS; SUBCUTANEOUS at 04:44

## 2020-08-18 RX ADMIN — Medication 40 MILLIEQUIVALENT(S): at 07:43

## 2020-08-18 RX ADMIN — Medication 650 MILLIGRAM(S): at 16:52

## 2020-08-18 RX ADMIN — LIDOCAINE 1 PATCH: 4 CREAM TOPICAL at 04:21

## 2020-08-18 NOTE — DISCHARGE NOTE PROVIDER - CARE PROVIDER_API CALL
Bryson Santizo  [address] [place] [address]  Phone: [phone]  Fax: [phone]   )    -  Scheduled Appointment: 08/26/2020 10:00 AM

## 2020-08-18 NOTE — DISCHARGE NOTE PROVIDER - NSDCCPTREATMENT_GEN_ALL_CORE_FT
PRINCIPAL PROCEDURE  Procedure: Transthoracic echocardiography (TTE)  Findings and Treatment: TRANSTHORACIC ECHOCARDIOGRAM REPORT  PROCEDURE DATE:  08/17/2020    CONCLUSIONS:   1. Patient tachycardic during the study.   2. There is mild tricuspid regurgitation. Pulmonary artery systolic pressure (estimated using the tricuspid regurgitant gradient and an estimate of right atrial pressure) is 48 mmHg.   3. The right ventricle is borderline dilated. Right ventricular systolic function is normal.   4. The left ventricle is normal in size, wall thickness, and systolic function with a calculated ejection fraction of 55%.   5. Trivial pericardial effusion.   6. Left pleural effusion.  FINDINGS:  Left Ventricle:  The left ventricle is normal in size, wall thickness, and systolic function with a calculated ejection fraction of 55%. There are no regional wall motion abnormalities seen. There is normal left ventricular diastolic function and filling pressure.  Right Ventricle:  The right ventricle is borderline dilated. Right ventricular systolic function is normal. The tricuspid annular plane systolic excursion (TAPSE) is 33.00 mm (normal >=17 mm). RV tissue Doppler S' is 13.00 cm/s (normal >10 cm/s).  Left Atrium:  The left atrium is normal in size.  Right Atrium:  The right atrium is normal in size.  Aortic Valve:  The aortic valve is tricuspid with normal structure and function without stenosis.  Mitral Valve:  Structurally normal mitral valve with normal leaflet excursion. There is trace mitral regurgitation.  Tricuspid Valve:  Structurally normal tricuspid valve with normal leaflet excursion. There is mild tricuspid regurgitation. Pulmonary artery systolic pressure (estimated using the tricuspid regurgitant gradient and an estimate of right atrial pressure) is 48 mmHg.  Inferior Vena Cava:  The inferior vena cava is normal in size (<2.1cm) withnormal inspiratory collapse (>50%) consistent with normal right atrial pressure (  3, range 0-5mmHg).  Pulmonic Valve:  Structurally normal pulmonic valve with normal leaflet excursion. There is trace pulmonic regurgitation.  Aorta:  The aortic ron      SECONDARY PROCEDURE  Procedure: CT abdomen pelvis  Findings and Treatment: EXAM:  CT ABDOMEN AND PELVIS                        PROCEDURE DATE:  08/14/2020    INTERPRETATION:  CT SCAN OF ABDOMEN AND PELVIS  Findings:  Evaluation of the abdominopelvic viscera is limited due to noncontrast technique and streak artifact from overlying arms.  Lower chest: No consolidation or significant pleural effusion.  Liver:  Diffuse hepatic steatosis. Mild hepatomegaly. No definite focal lesion.  Gallbladder: No calcified gallstones. Gallbladder sludge. No biliary dilatation.  Spleen:  Normal.  Pancreas:  Pancreas is diffusely enlarged with significant amount of peripancreatic fat strandingand fluid tracking down both lateral aspects of the anterior pararenal space. No drainable/organized collection on this noncontrast examination. No pancreatic duct dilatation.  Adrenal glands:  Normal.  Kidneys: Normal.  Adenopathy:  No lymphadenopathy in abdomen or pelvis.  Ascites: Mild ascites along the right paracolic gutter and within the rectovesical pouch.  Gastrointestinal tract: Evaluation of the GI tract without the use of oral contrast shows an enteric tube with tip in the gastric antrum. No bowel obstruction. Gas-filled ascending and transverse colon. Wall thickening of the descending colon is likely reactive due to adjacent inflammatory changes from the acute pancreatitis. Possible mild wall thickening of the second andthird duodenal segments could represent reactive duodenitis. Appendix is normal.  Vessels: Normal.  Pelvic organs: Distended bladder. Normal-sized prostate.  Soft tissues: Normal.  Bones: Normal.  Impression:  Interstitial edematous pancreatitis. No drainable/organized collection on this noncontrast examination. Additional findings as above.    Procedure: Limited abdominal ultrasound  Findings and Treatment: EXAM:  US ABDOMEN LIMITED                        PROCEDURE DATE:  08/15/2020    INTERPRETATION:  RIGHT UPPER QUADRANT ULTRASOUND dated 8/15/2020 6:41 PM  INDICATION: Alcohol abuse. Pancreatitis.  PRIOR STUDIES: Comparison is made to CT of abdomen and pelvis from 8/14/2020.  FINDINGS:  Liver: Increased echogenicity with no visible focal abnormality. The liver is enlarged, measuring 19.7 cm. Smooth hepatic contour. The visualized portions of the hepatic and portal veins are patent.  Intrahepatic ducts: Not dilated.  Common bile duct: Normal diameter, measuring 0.5 cm.  Gallbladder: No visible gallstones. No wall thickening or pericholecystic fluid.  Pancreas: Incompletely visualized. The pancreatic body is enlarged andhypoechoic, consistent with pancreatitis. There are small peripancreatic fluid.  Abdominal aorta: The visualized portions were unremarkable.  Inferior vena cava: The visualized portions were normal in appearance.  Right kidney: Normal echogenicity. No focal lesions. Length of 13.0 cm.  Ascites: Trace  Incidental note made of small right pleural effusion.  IMPRESSION:  1. Enlarged and hypoechoic pancreatic body with peripancreatic fluid, consistent with acute pancreatitis.  2. No gallstones.  3. Enlarged, fatty liver.  4. Small ascites.  5. Small right pleural effusion.

## 2020-08-18 NOTE — DISCHARGE NOTE PROVIDER - NSDCFUADDAPPT_GEN_ALL_CORE_FT
Please follow up with your primary care doctor, Dr. Santizo, on 8/26/2020 @ 10:00am.    Please have your primary care doctor refer you to a pulmonologist to follow up with the results of your heart ultrasound (transthoracic echocardiogram or TTE).

## 2020-08-18 NOTE — MEDICAL STUDENT PROGRESS NOTE(EDUCATION) - NS MD HP STUD ASPLAN ASSES FT
Temo Villarreal is a 30-yo male with history of alcohol use disorder and anxiety who is on day 4 of admission for abdominal pain and ~20 episodes of vomiting, found to have acute interstitial pancreatitis.

## 2020-08-18 NOTE — MEDICAL STUDENT PROGRESS NOTE(EDUCATION) - NS MD HP STUD ASPLAN PLAN FT
Problem/Plan - 1:  ·  Problem: Pancreatitis.  Plan: Patient with moderate-severe acute interstitial pancreatitis 2/2 EtOH use (trigs neg, no stone) given severe volume depletion, vitals (HR 150s SBP 90s, after fluid resus), Hct 52%, possible BISI. Lactate 16. S/p 5L NS in ED. Tele admit to monitor for severe sequelae: ARDS, DKA. Lactate resolved, lactate 1.7 8/17. Put on regular diet 8/16. Given dilaudid 1mg IVP q6 (for severe pain) and 0.5mg q2 (for moderate pain). Cancelled fluids and promote PO fluid intake 8/18.   - continue folic acid, multivitamin, thiamine  - encourage using tylenol and ibuprofen for pain        Problem/Plan - 2:  ·  Problem: Sinus tachycardia.  Plan: Sinus tac in setting of severe dehydration/pancreatitis. CT with enlarged heart concern for EtOH Cardiomyopathy. TTE results as above. Most likely 2/2 anxiety.       Problem/Plan - 3:  ·  Problem: Pulmonary arterial hypertension.  Plan: TTE showed pulmonary arterial systolic pressure 48 mmHg. Has no dyspnea, no increased work of breathing. Respiratory rate 17. Blood pressure 161/95 (overnight 159/111-160/107). Most likely 2/2 increased volume due to fluid resuscitation treatment.   - have patient follow up with pulmonologist outpatient    Problem/Plan - 4:  ·  Problem: Transaminitis.  Plan: AST 76, ALT 51, Alk phos 61 on 8/18. AST peaked at 131. Most likely alcoholic liver disease (2:1 ratio of AST to ALT).   - no additional management needed at this time    Problem/Plan - 5:  ·  Problem: Hyperglycemia.  Plan: Glu 300 on admission, downtrending with fluid resus. BHB 0.4. Trigs 133. No severe acidosis (likely compensated) Likely not DKA/HHS. A1C 5.6, well controlled.        Problem/Plan - 6:  ·  Problem: Alcoholic ketoacidosis.  Plan: Mixed AGMA and metabolic alkalosis. Suspect lactic acidosis 2/2 pre-renal dehydration, metabolic acidosis due to vomiting. Lactate 16 --> 13.6 --> 11 --> 7.6 --> 8.7 --> 4.1 -->1.7. Lactate resolved 8/17.        Problem/Plan - 7:  ·  Problem: Leukocytosis.  Plan: WBC 19 likely reactive/concentrated, s/p zosyn in ED. WBC 9.43 on 8/18. Most likely resolved.        Problem/Plan - 8:  Problem: R/O Volvulus. Plan: ED consulted surgery for XR showing suspicion of sigmoid volvulus. Seen by surgery team in ED - no surgical management is warranted at this time. NGT was inserted w/ output of 150 dark fluid. CT showed no evidence of volvulus. NG tube removed 8/15.        Problem/Plan - 9:  ·  Problem: Alcohol abuse.  Plan: Reports daily drinker (suspect minimizing extent of use). In ED, CIWA of  4 after ativan/morphine for hand tremor and n/v. Pancreatitis pain may confound CIWA scores. Bedside CIWA score of 1 (tremors not visible but felt)  -High dose thiamine, folic acid, multivitamin replacement as above  -High risk CIWA protocol   -ETOH cessation counseling       Problem/Plan - 10:  ·  Problem: Alcoholic steatohepatitis.  Plan: INR 1.5, mild transaminitis. PT 14.3, INR 1.2 on 8/18. RUQ u/s resutls as above.  -ETOH cessation counseling        Problem/Plan - 11:  ·  Problem: Nutrition, metabolism, and development symptoms.  Plan: F: LR 250cc/hr   E: replete K, Mg, Ca, Phos to goal   N: Advance as tolerated   DVT: SCD, Lovenox   Code: Full.

## 2020-08-18 NOTE — DISCHARGE NOTE PROVIDER - NSDCCPCAREPLAN_GEN_ALL_CORE_FT
PRINCIPAL DISCHARGE DIAGNOSIS  Diagnosis: Pancreatitis  Assessment and Plan of Treatment:       SECONDARY DISCHARGE DIAGNOSES  Diagnosis: Alcohol abuse  Assessment and Plan of Treatment: You have a history of alcohol dependence or alcoholism. With alcohol dependence, you drink alcohol too much and too often for a longer period of time. Dependence symptoms include needing more alcohol to get the same effects, having symptoms of withdrawal if you stop drinking, and may be unable to control your drinking. Alcohol abuse is dangerous to your liver and you may experience alcohol withdrawal if you stop drinking suddenly. Common symptoms of alcohol withdrawal include shaking, throwing up, sweating, and anxiety. Please follow up closely with your PCP to be properly monitored and treated. Please seek care immediately or call 911 if you have a convulsion, trouble breathing, chest pain, fast heartbeat, and feel like hurting yourself or someone else. PRINCIPAL DISCHARGE DIAGNOSIS  Diagnosis: Pancreatitis  Assessment and Plan of Treatment: Pancreatitis is a condition that can cause severe belly pain. The pancreas is an organ that makes hormones and juices that help break down food. Pancreatitis is the term for when this organ gets irritated or swollen. Most people get over pancreatitis without any long-lasting effects. But a few people get very sick. There are many causes of pancreatitis. But most cases are caused by gallstones or alcohol abuse. People who drink too much alcohol for too long sometimes get alcohol-related pancreatitis. People with this form of pancreatitis usually start to feel pain 1 to 3 days after drinking a lot of alcohol or after they suddenly stop drinking. They usually also have nausea and vomiting. Pancreatitis is usually treated in the hospital. There, your doctor or nurse can give you fluids and pain medicines to help you feel better. If you cannot eat, they can give you food through a tube. Some people with pancreatitis get an infection, which can be treated with antibiotics. Other possible problems caused by pancreatitis are fluid buildup around the pancreas or organ failure. Fluid buildup around pancreas often goes away on its own but sometimes needs to be drained or treated with surgery. Organ failure is usually handled by a team of doctors in intensive care. Another important part of treatment is to get rid of the cause of the pancreatitis. If your pancreatitis is caused by gallstones, your doctor might need to treat them, too. People with pancreatitis from alcohol use must learn to give up alcohol to keep from getting pancreatitis again. Your pancreatitis is likely due to your alcohol use. It is important to decrease the amount of alcohol you drink to a safer level. During your hospitalization, you had hiccups from your pancreatitis. We prescribed you chlorpromazine (thorazine) for your hiccups.      SECONDARY DISCHARGE DIAGNOSES  Diagnosis: Alcohol abuse  Assessment and Plan of Treatment: You have a history of alcohol dependence or alcoholism. With alcohol dependence, you drink alcohol too much and too often for a longer period of time. Dependence symptoms include needing more alcohol to get the same effects, having symptoms of withdrawal if you stop drinking, and may be unable to control your drinking. Alcohol abuse is dangerous to your liver and you may experience alcohol withdrawal if you stop drinking suddenly. Common symptoms of alcohol withdrawal include shaking, throwing up, sweating, and anxiety. Please follow up closely with your PCP to be properly monitored and treated. Please seek care immediately or call 911 if you have a convulsion, trouble breathing, chest pain, fast heartbeat, and feel like hurting yourself or someone else.

## 2020-08-18 NOTE — DISCHARGE NOTE PROVIDER - NSDCMRMEDTOKEN_GEN_ALL_CORE_FT
acetaminophen 325 mg oral tablet: 2 tab(s) orally every 6 hours -for moderate pain   Multiple Vitamins oral tablet: 1 tab(s) orally once a day acetaminophen 325 mg oral tablet: 2 tab(s) orally every 6 hours -for moderate pain   chlorproMAZINE 25 mg oral tablet: 1 tab(s) orally every 8 hours for hiccups  folic acid 1 mg oral tablet: 1 tab(s) orally once a day  lidocaine 5% topical film: Apply topically to affected area once a day (12 hours on, 12 hours off)  Multiple Vitamins oral tablet: 1 tab(s) orally once a day  thiamine 250 mg oral tablet: 1 tab(s) orally once a day

## 2020-08-18 NOTE — DISCHARGE NOTE PROVIDER - PROVIDER TOKENS
FREE:[LAST:[Darlyn],FIRST:[Bryson],PHONE:[phone]],FAX:[phone]    -],ADDRESS:[[address] [place] [address]],SCHEDULEDAPPT:[08/26/2020],SCHEDULEDAPPTTIME:[10:00 AM]]

## 2020-08-18 NOTE — MEDICAL STUDENT PROGRESS NOTE(EDUCATION) - SUBJECTIVE AND OBJECTIVE BOX
Temo Villarreal is a 30-yo male with history of alcohol use disorder and anxiety who is on day 4 of admission for Temo Villarreal is a 30-yo male with history of alcohol use disorder and anxiety who is on day 4 of admission for abdominal pain and ~20 episodes of vomiting, found to have acute interstitial pancreatitis.     No significant overnight events.     This morning at bedside, pt     Vital Signs Last 24 Hrs  T(C): 37.4 (18 Aug 2020 05:33), Max: 37.4 (18 Aug 2020 05:33)  T(F): 99.4 (18 Aug 2020 05:33), Max: 99.4 (18 Aug 2020 05:33)  HR: 92 (18 Aug 2020 05:33) (92 - 103)  BP: 161/95 (18 Aug 2020 05:33) (159/111 - 161/95)  BP(mean): --  RR: 17 (18 Aug 2020 05:33) (17 - 20)  SpO2: 94% (18 Aug 2020 05:33) (94% - 96%) Temo Villarreal is a 30-yo male with history of alcohol use disorder and anxiety who is on day 4 of admission for abdominal pain and ~20 episodes of vomiting, found to have acute interstitial pancreatitis.     No significant overnight events.     This morning at bedside, pt says he is feeling much better, close to baseline. He says his stomach pain is much better, and pain is well managed during the day on low dose but needs more to sleep at night. He was asking to be discharged with a few days medications for pain and hiccups, and was interested in medications to keep him off alcohol. He states that he had normal bowel movements. He denies fever, headache, nausea, vomiting, diarrhea, visual hallucinations, tremor with outstretched hands.     Vital Signs Last 24 Hrs  T(C): 37.4 (18 Aug 2020 05:33), Max: 37.4 (18 Aug 2020 05:33)  T(F): 99.4 (18 Aug 2020 05:33), Max: 99.4 (18 Aug 2020 05:33)  HR: 92 (18 Aug 2020 05:33) (92 - 103)  BP: 161/95 (18 Aug 2020 05:33) (159/111 - 161/95)  BP(mean): --  RR: 17 (18 Aug 2020 05:33) (17 - 20)  SpO2: 94% (18 Aug 2020 05:33) (94% - 96%)    Physical Exam:   General: pt is pleasant, well-appearing, in no acute distress  CV: +S1/S2, tachycardic, no murmurs/rubs/gallops  Pulm: normal lung sounds  Abdomen: + bowel sounds in all 4 quadrants  Ext: warm, not edematous    Labs:                         10.9   9.43  )-----------( 157      ( 18 Aug 2020 05:53 )             32.1     08-18    139  |  101  |  3<L>  ----------------------------<  118<H>  3.5   |  26  |  0.70    Ca    8.7      18 Aug 2020 05:53  Phos  3.7     08-18  Mg     1.9     08-18    TPro  6.2  /  Alb  3.0<L>  /  TBili  0.8  /  DBili  x   /  AST  116<H>  /  ALT  62<H>  /  AlkPhos  93  08-18    Imaging:   US results from 8/15:   1. Enlarged and hypoechoic pancreatic body with peripancreatic fluid, consistent with acute pancreatitis.  2. No gallstones.  3. Enlarged, fatty liver.  4. Small ascites.  5. Small right pleural effusion.    Echocardiogram results:  1. Patient tachycardic during the study.   2. There is mild tricuspid regurgitation. Pulmonary artery systolic pressure (estimated using the tricuspid regurgitant gradient and an estimate of right atrial pressure) is 48 mmHg.   3. The right ventricle is borderline dilated. Right ventricular systolic function is normal.   4. The left ventricle is normal in size, wall thickness, and systolic function with a calculated ejection fraction of 55%.   5. Trivial pericardial effusion.   6. Left pleural effusion.

## 2020-08-18 NOTE — DISCHARGE NOTE PROVIDER - HOSPITAL COURSE
#Discharge: do not delete        Patient is 31 yo M with past medical history of anxiety, EtOH abuse    Presented with abdominal pain x3 days and severe emesis, found to have acute interstitial pancreatitis and multiple electrolyte derangements 2/2 dehydration and emesis    Problem List/Main Diagnoses (system-based):         Gastrointestinal    #Pancreatitis    Patient with moderate-severe acute interstitial pancreatitis 2/2 EtOH use (trigs neg, no stone) given severe volume depletion, vitals (HR 150s SBP 90s, after fluid resus), Hct 52%, possible BISI. Lactate 16. S/p 5L NS in ED. CT AP showing interstitial edematous pancreatitis. No drainable/organized collection on this noncontrast examination. RUQ US showing Enlarged and hypoechoic pancreatic body with peripancreatic fluid, consistent with acute pancreatitis. Lactate resolved    - Aggressive fluid resuscitation per GI and surgery recs     - Hyperglycemia controlled     - Dilaudid .5mg IVP q2 PRN for mod pain, 1mg IVP q6hr for severe pain    - Lidocaine patch for back pain    - NG tube removed --> patient advanced to regular diet and tolerating well.     - Early refeeding, folic acid, multivitamin, thiamine    - Thorazine and gabapentin for hiccups        Alcoholic steatohepatitis.    PT, PTT, INR wnl. Mild transaminitis likely 2/2 alcohol hepatitis. Abdominal US showing enlarged, fatty liver.    -LFTs downtrending    -ETOH cessation counseling         #R/O Volvulus.    ED consulted surgery for XR showing suspicion of sigmoid volvulus. Seen by surgery team in ED - no surgical management is warranted at this time. NGT was inserted w/ output of 150 dark fluid.    - NGT removed    - Patient tolerating regular diet and having BM    - No concern for volvulus at this time        Endocrine    #Hyperglycemia.     Glu 300 on admission, downtrending with fluid resus. BHB 0.4. Trigs 133. No severe acidosis (likely compensated). HA1c 5.6 on 8/14. glucose improving    - MISS q6hr        Cardiac    Sinus tachycardia    Sinus tac in setting of severe dehydration/pancreatitis. CT with enlarged heart concern for EtOH Cardiomyopathy     - TTE w/o Contrast w/ Doppler      1. Patient tachycardic during the study.     2. There is mild tricuspid regurgitation. Pulmonary artery systolic pressure (estimated using the tricuspid regurgitant gradient and an estimate of right atrial pressure) is 48 mmHg.     3. The right ventricle is borderline dilated. Right ventricular systolic function is normal.     4. The left ventricle is normal in size, wall thickness, and systolic function with a calculated ejection fraction of 55%.     5. Trivial pericardial effusion.     6. Left pleural effusion.    - HR~110, s/d to regional.     -F/u outpatient w/ PCP        Infectious    Leukocytosis    WBC 19 likely reactive/concentrated, s/p zosyn in ED. WBC down to 11 this AM.     -Will hold additional abx for now    -Continue to trend        Renal    Alcoholic ketoacidosis.    Mixed AGMA and metabolic alkalosis. Suspect lactic acidosis 2/2 starvation ketosis 2/2 decreased PO intake and vomiting. Lactate normalized w/ fluids.    - Diet advanced        #Elevated lactate:     Lactate 16 --> 13.6 --> 11 --> 7.6 --> 8.7 -> 4.1 -> 1.7. Likely in the setting of starvation ketosis 2/2 decreased PO intake and vomiting    - Resolved with IV hydration, encouraged PO intake        Psych    Alcohol abuse    Reports daily drinker (suspect minimizing extent of use). In ED, CIWA of  4 after ativan/morphine for hand tremor and n/v. Pancreatitis pain may confound CIWA scores. Last drink afternoon of 8/14    - 1mg ativan given in ED, no additional PRN required    - No librium necessary during admission    - High dose thiamine, folic acid, multivitamin replacement     - ETOH cessation counseling            Inpatient treatment course:     8/14: patient given ativan 1mg, chlorpromazine 25mg, morphine 4mg, zofran 4mg, protonix 40mg, zosyn, 5LNS, started on 125cc/hr    8/15: Admitted to [facility] due to acute pancreatitis in setting of multiple electrolyte derangements and sinus tachycardia. Started on LR @250cc/hr. CIWA 1 at bedside. Sump removed, started clear diet. fluids discontinued. K elevated and EKG was normal. repeat K normalized. Lactate downtrended. Patient passing gas but no bowel movement.     O/n: Had a BM. Gabapentin 100mg TID prn for hiccups. Additional 1mg Dilaudid given for pain. Melatonin ordered.     8/16: Started chlorpromazine for hiccups, tolerated clears, switched diet to regular, s/d RMF. Repeated CXR, unremarkable. Restarted fluids to 200/hr given poor UOP.     8/17: TTE showing patient tachycardic during the study, mild tricuspid regurgitation, pulmonary artery systolic pressure (estimated using the tricuspid regurgitant gradient and an estimate of right atrial pressure) is 48 mmHg, right ventricle is borderline dilated, right ventricular systolic function is normal, left ventricle is normal in size, wall thickness, and systolic function with a calculated ejection fraction of 55%, trivial pericardial effusion, left pleural effusion.    8/18: IV fluids d/c'd given patient tolerating PO and voiding spontaneously    New medications:     Labs to be followed outpatient:     Exam to be followed outpatient: Abdominal US, CT A/P

## 2020-08-18 NOTE — DISCHARGE NOTE NURSING/CASE MANAGEMENT/SOCIAL WORK - PATIENT PORTAL LINK FT
You can access the FollowMyHealth Patient Portal offered by [facility] by registering at the following website: [email or web address]. By joining [facility]’s FollowMyHealth portal, you will also be able to view your health information using other applications (apps) compatible with our system.

## 2020-08-20 LAB
CULTURE RESULTS: SIGNIFICANT CHANGE UP
CULTURE RESULTS: SIGNIFICANT CHANGE UP
SPECIMEN SOURCE: SIGNIFICANT CHANGE UP
SPECIMEN SOURCE: SIGNIFICANT CHANGE UP

## 2020-08-24 ENCOUNTER — APPOINTMENT (OUTPATIENT)
Dept: INTERNAL MEDICINE | Facility: CLINIC | Age: 30
End: 2020-08-24
Payer: OTHER GOVERNMENT

## 2020-08-24 DIAGNOSIS — D64.9 ANEMIA, UNSPECIFIED: ICD-10-CM

## 2020-08-24 DIAGNOSIS — K85.90 ACUTE PANCREATITIS WITHOUT NECROSIS OR INFECTION, UNSPECIFIED: ICD-10-CM

## 2020-08-24 DIAGNOSIS — R74.0 NONSPECIFIC ELEVATION OF LEVELS OF TRANSAMINASE AND LACTIC ACID DEHYDROGENASE [LDH]: ICD-10-CM

## 2020-08-24 DIAGNOSIS — Z11.4 ENCOUNTER FOR SCREENING FOR HUMAN IMMUNODEFICIENCY VIRUS [HIV]: ICD-10-CM

## 2020-08-24 DIAGNOSIS — Z78.9 OTHER SPECIFIED HEALTH STATUS: ICD-10-CM

## 2020-08-24 LAB — HIV1+2 AB SPEC QL IA.RAPID: NEGATIVE

## 2020-08-24 PROCEDURE — 99495 TRANSJ CARE MGMT MOD F2F 14D: CPT | Mod: GC,95

## 2020-08-24 RX ORDER — ESCITALOPRAM OXALATE 10 MG/1
10 TABLET ORAL
Qty: 30 | Refills: 2 | Status: DISCONTINUED | COMMUNITY
Start: 2020-01-08 | End: 2020-08-24

## 2020-08-24 RX ORDER — ESCITALOPRAM OXALATE 5 MG/1
5 TABLET ORAL DAILY
Qty: 30 | Refills: 0 | Status: DISCONTINUED | COMMUNITY
Start: 2019-12-20 | End: 2020-08-24

## 2020-08-26 ENCOUNTER — APPOINTMENT (OUTPATIENT)
Dept: INFECTIOUS DISEASE | Facility: CLINIC | Age: 30
End: 2020-08-26

## 2020-08-26 DIAGNOSIS — F10.188 ALCOHOL ABUSE WITH OTHER ALCOHOL-INDUCED DISORDER: ICD-10-CM

## 2020-08-26 DIAGNOSIS — D72.829 ELEVATED WHITE BLOOD CELL COUNT, UNSPECIFIED: ICD-10-CM

## 2020-08-26 DIAGNOSIS — N17.9 ACUTE KIDNEY FAILURE, UNSPECIFIED: ICD-10-CM

## 2020-08-26 DIAGNOSIS — R73.9 HYPERGLYCEMIA, UNSPECIFIED: ICD-10-CM

## 2020-08-26 DIAGNOSIS — J90 PLEURAL EFFUSION, NOT ELSEWHERE CLASSIFIED: ICD-10-CM

## 2020-08-26 DIAGNOSIS — R11.0 NAUSEA: ICD-10-CM

## 2020-08-26 DIAGNOSIS — K85.20 ALCOHOL INDUCED ACUTE PANCREATITIS WITHOUT NECROSIS OR INFECTION: ICD-10-CM

## 2020-08-26 DIAGNOSIS — I36.1 NONRHEUMATIC TRICUSPID (VALVE) INSUFFICIENCY: ICD-10-CM

## 2020-08-26 DIAGNOSIS — I27.20 PULMONARY HYPERTENSION, UNSPECIFIED: ICD-10-CM

## 2020-08-26 DIAGNOSIS — R06.6 HICCOUGH: ICD-10-CM

## 2020-08-26 DIAGNOSIS — K70.11 ALCOHOLIC HEPATITIS WITH ASCITES: ICD-10-CM

## 2020-08-26 DIAGNOSIS — K76.0 FATTY (CHANGE OF) LIVER, NOT ELSEWHERE CLASSIFIED: ICD-10-CM

## 2020-08-26 DIAGNOSIS — F17.290 NICOTINE DEPENDENCE, OTHER TOBACCO PRODUCT, UNCOMPLICATED: ICD-10-CM

## 2020-08-26 DIAGNOSIS — E87.4 MIXED DISORDER OF ACID-BASE BALANCE: ICD-10-CM

## 2020-08-26 DIAGNOSIS — I42.6 ALCOHOLIC CARDIOMYOPATHY: ICD-10-CM

## 2020-08-26 DIAGNOSIS — E86.0 DEHYDRATION: ICD-10-CM

## 2023-04-27 NOTE — ED ADULT TRIAGE NOTE - NS ED TRIAGE AVPU SCALE
Alert-The patient is alert, awake and responds to voice. The patient is oriented to time, place, and person. The triage nurse is able to obtain subjective information. 27-Apr-2023 15:33

## 2024-11-12 NOTE — PROGRESS NOTE ADULT - PROBLEM/PLAN-4
DISPLAY PLAN FREE TEXT
Yes - the patient is able to be screened